# Patient Record
Sex: MALE | Race: WHITE | NOT HISPANIC OR LATINO | URBAN - METROPOLITAN AREA
[De-identification: names, ages, dates, MRNs, and addresses within clinical notes are randomized per-mention and may not be internally consistent; named-entity substitution may affect disease eponyms.]

---

## 2017-01-03 ENCOUNTER — OUTPATIENT (OUTPATIENT)
Dept: OUTPATIENT SERVICES | Facility: HOSPITAL | Age: 77
LOS: 1 days | End: 2017-01-03
Payer: MEDICARE

## 2017-01-03 ENCOUNTER — APPOINTMENT (OUTPATIENT)
Dept: NUCLEAR MEDICINE | Facility: CLINIC | Age: 77
End: 2017-01-03

## 2017-01-03 DIAGNOSIS — Z00.8 ENCOUNTER FOR OTHER GENERAL EXAMINATION: ICD-10-CM

## 2017-01-03 PROCEDURE — 78815 PET IMAGE W/CT SKULL-THIGH: CPT

## 2017-01-03 PROCEDURE — A9552: CPT

## 2017-01-03 PROCEDURE — 70491 CT SOFT TISSUE NECK W/DYE: CPT

## 2017-01-05 DIAGNOSIS — C44.90 UNSPECIFIED MALIGNANT NEOPLASM OF SKIN, UNSPECIFIED: ICD-10-CM

## 2017-01-05 DIAGNOSIS — R59.0 LOCALIZED ENLARGED LYMPH NODES: ICD-10-CM

## 2017-01-05 DIAGNOSIS — C44.122 SQUAMOUS CELL CARCINOMA OF SKIN OF RIGHT EYELID, INCLUDING CANTHUS: ICD-10-CM

## 2017-01-17 ENCOUNTER — APPOINTMENT (OUTPATIENT)
Dept: CARDIOLOGY | Facility: CLINIC | Age: 77
End: 2017-01-17

## 2017-01-23 ENCOUNTER — FORM ENCOUNTER (OUTPATIENT)
Age: 77
End: 2017-01-23

## 2017-01-24 ENCOUNTER — APPOINTMENT (OUTPATIENT)
Dept: MRI IMAGING | Facility: CLINIC | Age: 77
End: 2017-01-24

## 2017-01-24 ENCOUNTER — OUTPATIENT (OUTPATIENT)
Dept: OUTPATIENT SERVICES | Facility: HOSPITAL | Age: 77
LOS: 1 days | End: 2017-01-24
Payer: MEDICARE

## 2017-01-24 DIAGNOSIS — I65.29 OCCLUSION AND STENOSIS OF UNSPECIFIED CAROTID ARTERY: ICD-10-CM

## 2017-01-24 PROCEDURE — 70549 MR ANGIOGRAPH NECK W/O&W/DYE: CPT

## 2017-01-24 PROCEDURE — A9585: CPT

## 2017-02-07 ENCOUNTER — RX RENEWAL (OUTPATIENT)
Age: 77
End: 2017-02-07

## 2017-02-10 ENCOUNTER — APPOINTMENT (OUTPATIENT)
Dept: CT IMAGING | Facility: CLINIC | Age: 77
End: 2017-02-10

## 2017-02-10 ENCOUNTER — OUTPATIENT (OUTPATIENT)
Dept: OUTPATIENT SERVICES | Facility: HOSPITAL | Age: 77
LOS: 1 days | End: 2017-02-10
Payer: MEDICARE

## 2017-02-10 ENCOUNTER — APPOINTMENT (OUTPATIENT)
Dept: NEUROSURGERY | Facility: CLINIC | Age: 77
End: 2017-02-10

## 2017-02-10 DIAGNOSIS — I65.29 OCCLUSION AND STENOSIS OF UNSPECIFIED CAROTID ARTERY: ICD-10-CM

## 2017-02-10 PROCEDURE — 70498 CT ANGIOGRAPHY NECK: CPT | Mod: 26

## 2017-02-10 PROCEDURE — 70498 CT ANGIOGRAPHY NECK: CPT

## 2017-02-10 PROCEDURE — 70496 CT ANGIOGRAPHY HEAD: CPT

## 2017-02-10 PROCEDURE — 70496 CT ANGIOGRAPHY HEAD: CPT | Mod: 26

## 2017-02-22 ENCOUNTER — APPOINTMENT (OUTPATIENT)
Dept: CARDIOLOGY | Facility: CLINIC | Age: 77
End: 2017-02-22

## 2017-02-23 ENCOUNTER — APPOINTMENT (OUTPATIENT)
Dept: CARDIOLOGY | Facility: CLINIC | Age: 77
End: 2017-02-23

## 2017-03-03 ENCOUNTER — APPOINTMENT (OUTPATIENT)
Dept: INTERNAL MEDICINE | Facility: CLINIC | Age: 77
End: 2017-03-03

## 2017-03-03 VITALS
DIASTOLIC BLOOD PRESSURE: 60 MMHG | OXYGEN SATURATION: 96 % | RESPIRATION RATE: 14 BRPM | SYSTOLIC BLOOD PRESSURE: 170 MMHG | WEIGHT: 162 LBS | BODY MASS INDEX: 26.03 KG/M2 | HEIGHT: 66 IN | HEART RATE: 65 BPM

## 2017-03-03 VITALS — SYSTOLIC BLOOD PRESSURE: 160 MMHG | DIASTOLIC BLOOD PRESSURE: 70 MMHG

## 2017-03-04 ENCOUNTER — RX RENEWAL (OUTPATIENT)
Age: 77
End: 2017-03-04

## 2017-03-07 ENCOUNTER — APPOINTMENT (OUTPATIENT)
Dept: INTERNAL MEDICINE | Facility: CLINIC | Age: 77
End: 2017-03-07

## 2017-03-07 ENCOUNTER — NON-APPOINTMENT (OUTPATIENT)
Age: 77
End: 2017-03-07

## 2017-03-07 VITALS
DIASTOLIC BLOOD PRESSURE: 70 MMHG | OXYGEN SATURATION: 97 % | TEMPERATURE: 97.5 F | BODY MASS INDEX: 26.03 KG/M2 | RESPIRATION RATE: 14 BRPM | HEIGHT: 66 IN | SYSTOLIC BLOOD PRESSURE: 140 MMHG | WEIGHT: 162 LBS | HEART RATE: 104 BPM

## 2017-03-07 VITALS — SYSTOLIC BLOOD PRESSURE: 144 MMHG | DIASTOLIC BLOOD PRESSURE: 60 MMHG

## 2017-03-07 DIAGNOSIS — Z01.818 ENCOUNTER FOR OTHER PREPROCEDURAL EXAMINATION: ICD-10-CM

## 2017-03-08 LAB — POTASSIUM SERPL-SCNC: 5.3 MMOL/L

## 2017-03-14 ENCOUNTER — RESULT REVIEW (OUTPATIENT)
Age: 77
End: 2017-03-14

## 2017-03-14 VITALS
WEIGHT: 158.07 LBS | OXYGEN SATURATION: 96 % | TEMPERATURE: 97 F | DIASTOLIC BLOOD PRESSURE: 65 MMHG | RESPIRATION RATE: 16 BRPM | SYSTOLIC BLOOD PRESSURE: 144 MMHG | HEIGHT: 68 IN | HEART RATE: 94 BPM

## 2017-03-14 NOTE — PATIENT PROFILE ADULT. - VISION (WITH CORRECTIVE LENSES IF THE PATIENT USUALLY WEARS THEM):
pt has only one eye with normal vision/Normal vision: sees adequately in most situations; can see medication labels, newsprint

## 2017-03-14 NOTE — PATIENT PROFILE ADULT. - PSH
Encounter for biopsy  12/2012 - left side of head  Hx of cholecystectomy  2006  Squamous cell carcinoma  s/p multiple MOHs surgeries - head, face  last wide excision 1/13  Status post lateral meniscus repair Encounter for biopsy  12/2012 - left side of head  History of surgery  skull excision and removal of left eye  Hx of cholecystectomy  2006  Squamous cell carcinoma  s/p multiple MOHs surgeries - head, face  last wide excision 1/13  Status post lateral meniscus repair

## 2017-03-14 NOTE — PATIENT PROFILE ADULT. - PMH
Diabetes mellitus  Type 2  Hypertension    IBS (irritable bowel syndrome)    Pneumonia  > 5 years ago  Raynauds phenomenon    Squamous cell carcinoma    Thrombocytopenia Diabetes mellitus  Type 2  Gout    History of radiation therapy    History of skin cancer    HLD (hyperlipidemia)    Hypertension    IBS (irritable bowel syndrome)    Pneumonia  > 5 years ago  Raynauds phenomenon    Squamous cell carcinoma    Thrombocytopenia

## 2017-03-15 ENCOUNTER — INPATIENT (INPATIENT)
Facility: HOSPITAL | Age: 77
LOS: 0 days | Discharge: HOME | DRG: 39 | End: 2017-03-16
Attending: NEUROLOGICAL SURGERY | Admitting: NEUROLOGICAL SURGERY
Payer: COMMERCIAL

## 2017-03-15 ENCOUNTER — APPOINTMENT (OUTPATIENT)
Dept: NEUROSURGERY | Facility: HOSPITAL | Age: 77
End: 2017-03-15

## 2017-03-15 DIAGNOSIS — Z98.890 OTHER SPECIFIED POSTPROCEDURAL STATES: Chronic | ICD-10-CM

## 2017-03-15 LAB
ANION GAP SERPL CALC-SCNC: 9 MMOL/L — SIGNIFICANT CHANGE UP (ref 9–16)
APTT BLD: 29.5 SEC — SIGNIFICANT CHANGE UP (ref 27.5–37.4)
APTT BLD: 34.3 SEC — SIGNIFICANT CHANGE UP (ref 27.5–37.4)
BUN SERPL-MCNC: 29 MG/DL — HIGH (ref 7–23)
CALCIUM SERPL-MCNC: 8.3 MG/DL — LOW (ref 8.5–10.5)
CHLORIDE SERPL-SCNC: 106 MMOL/L — SIGNIFICANT CHANGE UP (ref 96–108)
CO2 SERPL-SCNC: 23 MMOL/L — SIGNIFICANT CHANGE UP (ref 22–31)
CREAT SERPL-MCNC: 1.01 MG/DL — SIGNIFICANT CHANGE UP (ref 0.5–1.3)
GLUCOSE SERPL-MCNC: 158 MG/DL — HIGH (ref 70–99)
HCT VFR BLD CALC: 32.5 % — LOW (ref 39–50)
HGB BLD-MCNC: 10.7 G/DL — LOW (ref 13–17)
INR BLD: 1.13 — SIGNIFICANT CHANGE UP (ref 0.88–1.16)
INR BLD: 1.2 — HIGH (ref 0.88–1.16)
LYMPHOCYTES # BLD AUTO: 14.1 % — SIGNIFICANT CHANGE UP (ref 13–44)
MAGNESIUM SERPL-MCNC: 1.9 MG/DL — SIGNIFICANT CHANGE UP (ref 1.6–2.4)
MCHC RBC-ENTMCNC: 32.7 PG — SIGNIFICANT CHANGE UP (ref 27–34)
MCHC RBC-ENTMCNC: 32.9 G/DL — SIGNIFICANT CHANGE UP (ref 32–36)
MCV RBC AUTO: 99.4 FL — SIGNIFICANT CHANGE UP (ref 80–100)
MONOCYTES NFR BLD AUTO: 1.8 % — LOW (ref 2–14)
NEUTROPHILS NFR BLD AUTO: 84.1 % — HIGH (ref 43–77)
PHOSPHATE SERPL-MCNC: 2.7 MG/DL — SIGNIFICANT CHANGE UP (ref 2.5–4.5)
PLATELET # BLD AUTO: 130 K/UL — LOW (ref 150–400)
POTASSIUM SERPL-MCNC: 4.3 MMOL/L — SIGNIFICANT CHANGE UP (ref 3.5–5.3)
POTASSIUM SERPL-SCNC: 4.3 MMOL/L — SIGNIFICANT CHANGE UP (ref 3.5–5.3)
PROTHROM AB SERPL-ACNC: 12.6 SEC — SIGNIFICANT CHANGE UP (ref 10–13.1)
PROTHROM AB SERPL-ACNC: 13.4 SEC — HIGH (ref 10–13.1)
RBC # BLD: 3.27 M/UL — LOW (ref 4.2–5.8)
RBC # FLD: 13.6 % — SIGNIFICANT CHANGE UP (ref 10.3–16.9)
SODIUM SERPL-SCNC: 138 MMOL/L — SIGNIFICANT CHANGE UP (ref 135–145)
TROPONIN I SERPL-MCNC: 0.02 NG/ML — SIGNIFICANT CHANGE UP (ref 0.01–0.04)
TROPONIN I SERPL-MCNC: 0.02 NG/ML — SIGNIFICANT CHANGE UP (ref 0.01–0.04)
WBC # BLD: 5.5 K/UL — SIGNIFICANT CHANGE UP (ref 3.8–10.5)
WBC # FLD AUTO: 5.5 K/UL — SIGNIFICANT CHANGE UP (ref 3.8–10.5)

## 2017-03-15 PROCEDURE — 99291 CRITICAL CARE FIRST HOUR: CPT | Mod: 24

## 2017-03-15 PROCEDURE — 35301 RECHANNELING OF ARTERY: CPT | Mod: RT

## 2017-03-15 PROCEDURE — 93010 ELECTROCARDIOGRAM REPORT: CPT

## 2017-03-15 RX ORDER — SODIUM CHLORIDE 9 MG/ML
1000 INJECTION, SOLUTION INTRAVENOUS
Qty: 0 | Refills: 0 | Status: DISCONTINUED | OUTPATIENT
Start: 2017-03-15 | End: 2017-03-16

## 2017-03-15 RX ORDER — RAMIPRIL 5 MG
1 CAPSULE ORAL
Qty: 0 | Refills: 0 | COMMUNITY

## 2017-03-15 RX ORDER — GLUCAGON INJECTION, SOLUTION 0.5 MG/.1ML
1 INJECTION, SOLUTION SUBCUTANEOUS ONCE
Qty: 0 | Refills: 0 | Status: DISCONTINUED | OUTPATIENT
Start: 2017-03-15 | End: 2017-03-16

## 2017-03-15 RX ORDER — INSULIN LISPRO 100/ML
VIAL (ML) SUBCUTANEOUS
Qty: 0 | Refills: 0 | Status: DISCONTINUED | OUTPATIENT
Start: 2017-03-15 | End: 2017-03-16

## 2017-03-15 RX ORDER — ASPIRIN/CALCIUM CARB/MAGNESIUM 324 MG
81 TABLET ORAL DAILY
Qty: 0 | Refills: 0 | Status: DISCONTINUED | OUTPATIENT
Start: 2017-03-15 | End: 2017-03-16

## 2017-03-15 RX ORDER — NICARDIPINE HYDROCHLORIDE 30 MG/1
5 CAPSULE, EXTENDED RELEASE ORAL
Qty: 40 | Refills: 0 | Status: DISCONTINUED | OUTPATIENT
Start: 2017-03-15 | End: 2017-03-16

## 2017-03-15 RX ORDER — DEXTROSE 50 % IN WATER 50 %
25 SYRINGE (ML) INTRAVENOUS ONCE
Qty: 0 | Refills: 0 | Status: DISCONTINUED | OUTPATIENT
Start: 2017-03-15 | End: 2017-03-16

## 2017-03-15 RX ORDER — CHOLECALCIFEROL (VITAMIN D3) 125 MCG
1 CAPSULE ORAL
Qty: 0 | Refills: 0 | COMMUNITY

## 2017-03-15 RX ORDER — DEXTROSE 50 % IN WATER 50 %
12.5 SYRINGE (ML) INTRAVENOUS ONCE
Qty: 0 | Refills: 0 | Status: DISCONTINUED | OUTPATIENT
Start: 2017-03-15 | End: 2017-03-16

## 2017-03-15 RX ORDER — ASPIRIN/CALCIUM CARB/MAGNESIUM 324 MG
1 TABLET ORAL
Qty: 0 | Refills: 0 | COMMUNITY

## 2017-03-15 RX ORDER — ACETAMINOPHEN 500 MG
1000 TABLET ORAL ONCE
Qty: 0 | Refills: 0 | Status: COMPLETED | OUTPATIENT
Start: 2017-03-15 | End: 2017-03-15

## 2017-03-15 RX ORDER — MAGNESIUM SULFATE 500 MG/ML
2 VIAL (ML) INJECTION ONCE
Qty: 0 | Refills: 0 | Status: DISCONTINUED | OUTPATIENT
Start: 2017-03-15 | End: 2017-03-15

## 2017-03-15 RX ORDER — ALLOPURINOL 300 MG
1 TABLET ORAL
Qty: 0 | Refills: 0 | COMMUNITY

## 2017-03-15 RX ORDER — DEXTROSE 50 % IN WATER 50 %
1 SYRINGE (ML) INTRAVENOUS ONCE
Qty: 0 | Refills: 0 | Status: DISCONTINUED | OUTPATIENT
Start: 2017-03-15 | End: 2017-03-16

## 2017-03-15 RX ORDER — HYDRALAZINE HCL 50 MG
1 TABLET ORAL
Qty: 0 | Refills: 0 | COMMUNITY

## 2017-03-15 RX ORDER — NICARDIPINE HYDROCHLORIDE 30 MG/1
5 CAPSULE, EXTENDED RELEASE ORAL
Qty: 40 | Refills: 0 | Status: DISCONTINUED | OUTPATIENT
Start: 2017-03-15 | End: 2017-03-15

## 2017-03-15 RX ORDER — MAGNESIUM SULFATE 500 MG/ML
2 VIAL (ML) INJECTION ONCE
Qty: 0 | Refills: 0 | Status: COMPLETED | OUTPATIENT
Start: 2017-03-15 | End: 2017-03-15

## 2017-03-15 RX ORDER — ASCORBIC ACID 60 MG
1 TABLET,CHEWABLE ORAL
Qty: 0 | Refills: 0 | COMMUNITY

## 2017-03-15 RX ADMIN — Medication 81 MILLIGRAM(S): at 20:01

## 2017-03-15 RX ADMIN — Medication 400 MILLIGRAM(S): at 18:38

## 2017-03-15 RX ADMIN — SODIUM CHLORIDE 75 MILLILITER(S): 9 INJECTION, SOLUTION INTRAVENOUS at 11:46

## 2017-03-15 RX ADMIN — Medication 1000 MILLIGRAM(S): at 20:01

## 2017-03-15 RX ADMIN — NICARDIPINE HYDROCHLORIDE 25 MG/HR: 30 CAPSULE, EXTENDED RELEASE ORAL at 14:02

## 2017-03-15 RX ADMIN — Medication 50 GRAM(S): at 14:39

## 2017-03-15 RX ADMIN — Medication 2: at 17:01

## 2017-03-15 NOTE — PROGRESS NOTE ADULT - SUBJECTIVE AND OBJECTIVE BOX
HPI:  76 yr old man with multiple medical problems, including DM, HTN, hyperlipidemia, and squamous cell carcinoma of head and face (currently in remission, S/P radical resection 2013 and XRT), who was recently found to have rapidly progressive R ICA stenosis on MRA (from 50% up to 85% on recent imaging). Pt is totally asymptomatic from a neurological standpoint, no recent or remote history of ischemic stroke. CTA shows a heavily calcified, short-segment atherosclerotic plaque at the origin of the R ICA. The patient is being admitted for elective R CEA. (15 Mar 2017 11:10)    Patient evaluated in PACU post procedure neurologically intact reporting 4/10 incisional pain  denies blurry vision, CP, SOB or difficulty breathing    OVERNIGHT EVENTS:  Vital Signs Last 24 Hrs  T(C): 36.6, Max: 37 (03-15 @ 11:30)  T(F): 97.9, Max: 98.6 (03-15 @ 11:30)  HR: 75 (64 - 75)  BP: 129/54 (105/51 - 132/55)  BP(mean): --  RR: 22 (16 - 22)  SpO2: 99% (99% - 100%)    I&O's Detail    I & Os for current day (as of 15 Mar 2017 14:01)  =============================================  IN:    Total IN: 0 ml  ---------------------------------------------  OUT:    Voided: 200 ml    Total OUT: 200 ml  ---------------------------------------------  Total NET: -200 ml    I&O's Summary    I & Os for current day (as of 15 Mar 2017 14:01)  =============================================  IN: 0 ml / OUT: 200 ml / NET: -200 ml      PHYSICAL EXAM:  Neurological:  A&O x 3, appears comfortable  Rt eye PERRL, EOMI  face symmetric  neg drift  ASHFORD x 4, motor 5/5 throughout   sensation intact b/l  incision site with scant bloody staining, IRAIDA drain intact    TUBES/LINES:  [] CVC  [x] A-line  [] Lumbar Drain  [] Ventriculostomy  [x] Other: surgical drain    DIET:  [x] NPO  [] Mechanical  [] Tube feeds    LABS:                        10.7   5.5   )-----------( 130      ( 15 Mar 2017 12:18 )             32.5     15 Mar 2017 12:18    138    |  106    |  29     ----------------------------<  158    4.3     |  23     |  1.01     Ca    8.3        15 Mar 2017 12:18  Phos  2.7       15 Mar 2017 12:18  Mg     1.9       15 Mar 2017 12:18      PT/INR - ( 15 Mar 2017 12:18 )   PT: 13.4 sec;   INR: 1.20          PTT - ( 15 Mar 2017 12:18 )  PTT:34.3 sec    CARDIAC MARKERS ( 15 Mar 2017 12:18 )  0.019 ng/mL / x     / x     / x     / x          CAPILLARY BLOOD GLUCOSE  158 (15 Mar 2017 12:00)      Drug Levels: [] N/A    CSF Analysis: [] N/A      Allergies    azithromycin (Hives)  red dye (Hives)    Intolerances      MEDICATIONS:  Antibiotics:    Neuro:  acetaminophen  IVPB. 1000milliGRAM(s) IV Intermittent once    Anticoagulation:    OTHER:  niCARdipine Infusion 5mG/Hr IV Continuous <Continuous>  insulin lispro (HumaLOG) corrective regimen sliding scale  SubCutaneous Before meals and at bedtime  dextrose Gel 1Dose(s) Oral once PRN  dextrose 50% Injectable 12.5Gram(s) IV Push once  dextrose 50% Injectable 25Gram(s) IV Push once  dextrose 50% Injectable 25Gram(s) IV Push once  glucagon  Injectable 1milliGRAM(s) IntraMuscular once PRN    IVF:  lactated ringers. 1000milliLiter(s) IV Continuous <Continuous>  dextrose 5%. 1000milliLiter(s) IV Continuous <Continuous>    CULTURES:    RADIOLOGY & ADDITIONAL TESTS:      ASSESSMENT:  76y Male s/p Rt CEA POD 0 neuro stable    PLAN:  NEURO:  q1h neuro checks  transfer to ICU pending bed availability  trend IRAIDA output, monitor incision site  pain control tylenol/percocet prn  carotid duplex in am    CARDIOVASCULAR:  SRAVAN, f/u ekgs post op  SBP goal 100-130 on cardene gtt  monitor a line    PULMONARY:  wean supplemental NC  enc IS use  OOB    RENAL:  NS hydration  replete mag  voiding    GI:  NPO  can advance diet    HEME:  trend h/h    ID:  post op ancef    ENDO:  ISS    DVT PROPHYLAXIS:  [x] Venodynes                                [] Heparin/Lovenox    DISPOSITION:   ICU status  full code  dispo planning  case d/w Dr. Encarnacion and Dr. Zamarripa

## 2017-03-15 NOTE — PROGRESS NOTE ADULT - ASSESSMENT
76M with severe right internal carotid artery stenosis, s/p carotid endarterectomy (03/15/2017, Dr. Zamarripa), Hypertension dyslipidemia dyslipidemia, gout, squamous cell carcinoma, BPH

## 2017-03-15 NOTE — BRIEF OPERATIVE NOTE - OPERATION/FINDINGS
PROCEDURE  R CEA    Heavily calcified, short-segment atherosclerotic plaque  Successful procedure, no complications

## 2017-03-15 NOTE — H&P ADULT - HISTORY OF PRESENT ILLNESS
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 Pt is totally asymptomatic from a neurological standpoint, no recent or remote history of ischemic stroke. CTA shows a heavily calcified, short-segment atherosclerotic plaque at the origin of the R ICA. The patient is being admitted for elective R CEA.

## 2017-03-15 NOTE — H&P ADULT - NSHPPHYSICALEXAM_GEN_ALL_CORE
Heart RRR  Lungs clear  Abd soft, non tender  L eye enucleated (squamous cell carcinoma)  L head and face skin flap well healed (covers R orbit)    Neuro exam:  AAOx3, fluent speech  R pupil small and reactive, EOMI, CN intact  Motor 5/5 x 4  Sensory intact LT x 4

## 2017-03-15 NOTE — PROGRESS NOTE ADULT - SUBJECTIVE AND OBJECTIVE BOX
=================================  NEUROCRITICAL CARE ATTENDING NOTE  =================================    FINKELSTEIN, HAROLD   [id number]  Summary:  76M with Diabetes Mellitus Hypertension dyslipidemia and SCC of head and face s/p radical resection and XRT ().  On imaging noted rapidly progressing R ICA stenosis (50% increasing to 85%), but clinically asymptomatic neurologically.  CTA showed a heavily calcified short-segment atherosclerotic plaque at R ICA origin. Admitted 03/15 for elective CEA.  Overnight Events: in PACU    PAST MEDICAL & SURGICAL HISTORY: History of skin cancer  Gout HLD (hyperlipidemia) History of radiation therapy Raynauds phenomenon Thrombocytopenia Squamous cell carcinoma IBS (irritable bowel syndrome) Diabetes mellitus: Type 2 BPH (benign prostatic hypertrophy) Pneumonia: &gt; 5 years ago Hypertension History of surgery: skull excision and removal of left eye Status post lateral meniscus repair Encounter for biopsy: 2012 - left side of head Squamous cell carcinoma: s/p multiple MOHs surgeries - head, face last wide excision  H/O arthroscopy of knee: 2012 - left Hx of cholecystectomy:   Home meds:  lipitor 10mg BID glipizide 2.5 mg daily ramipril 5mg daily hydralazine 50mg TID vitamin C cholecalciferol [id number] daily allopurinol    PHYSICAL EXAMINATION  T(C): , Max: 37 (03-15 @ 11:30) HR: 82 (64 - 82) BP: 146/67 (105/51 - 146/67) RR: 16 (16 - 22) SpO2: 99% (99% - 100%)  NEUROLOGIC EXAMINATION:  Patient is awake, alert, fully oriented, R pupils 2mm equal and briskly reactive to light, EOMs intact, moves all 4s  GENERAL:  not intubated, not in cardiorespiratory distress  EENT: anicteric  CARDIOVASC:  (+) S1 S2, normal rate and regular rhythm  PULMONARY:  clear to auscultation bilaterally  ABDOMEN:  soft, nontender, with normoactive bowel sounds  EXTREMITIES:  no edema  SKIN:  no rash    LABS:  CAPILLARY BLOOD GLUCOSE 158 (15 Mar 2017 12:00)                        10.7   5.5   )-----------( 130      ( 15 Mar 2017 12:18 )             32.5     138    |  106    |  29     ----------------------------<  158    4.3     |  23     |  1.01     Ca    8.3        15 Mar 2017 12:18  Phos  2.7       15 Mar 2017 12:18  Mg     1.9       15 Mar 2017 12:18    Neuroimagin/10 Dense calcified atherosclerotic plaque R carotid bifurcation and distal common carotid artery, at least 84% stenosis of ICA  Other imaging:     MEDICATIONS: mod ISS asa 81mg daily    IV FLUIDS: LR  DRIPS: cardene  DIET:  Lines / Drains: A line, Tirado    CODE STATUS:  full code                       GOALS OF CARE:  aggressive                      DISPOSITION:  ICU

## 2017-03-15 NOTE — PROGRESS NOTE ADULT - ATTENDING COMMENTS
PLAN:   NEURO: neurochecks q1h, PRN pain meds with Tylenol  s/p CEA:  monitor hemodynamics overnight, WOF hyper/hypotension; continue ASA as per NS  REHAB:  --defer for now---    EARLY MOB:  HOB up    PULM:  Room air, incentive spirometry  CARDIO:  SBP goal 100-130 mm Hg, cardene drip titrate to SBP goal; will restart PO meds in AM if hemodynamically stable  ENDO:  Blood sugar goals 140-180 mg/dL, continue insulin sliding scale, dyslipidemia: continue lipitor  GI:  docusate senna  DIET: bedside swallow, advance if he passes swallow screen  RENAL:  IVF until adequate PO intake  HEM/ONC: check post-op Hb  VTE Prophylaxis:  SCDs only, no DVT chemoprophylaxis as patient is fresh post-op  ID: afebrile, no leukocytosis  Social: will update family  MISC: Gout: continue allopurinol    Patient at high risk for neurological deterioration or death due to:  hypertension / hypotension, infarction, MI.  Critical care time, excluding procedures: 60 minutes.

## 2017-03-15 NOTE — H&P ADULT - PMH
Diabetes mellitus  Type 2  Gout    History of radiation therapy    History of skin cancer    HLD (hyperlipidemia)    Hypertension    IBS (irritable bowel syndrome)    Pneumonia  > 5 years ago  Raynauds phenomenon    Squamous cell carcinoma    Thrombocytopenia

## 2017-03-15 NOTE — H&P ADULT - PSH
Encounter for biopsy  12/2012 - left side of head  History of surgery  skull excision and removal of left eye  Hx of cholecystectomy  2006  Squamous cell carcinoma  s/p multiple MOHs surgeries - head, face  last wide excision 1/13  Status post lateral meniscus repair

## 2017-03-16 ENCOUNTER — TRANSCRIPTION ENCOUNTER (OUTPATIENT)
Age: 77
End: 2017-03-16

## 2017-03-16 VITALS
OXYGEN SATURATION: 97 % | RESPIRATION RATE: 24 BRPM | DIASTOLIC BLOOD PRESSURE: 66 MMHG | SYSTOLIC BLOOD PRESSURE: 157 MMHG | HEART RATE: 76 BPM

## 2017-03-16 LAB
ANION GAP SERPL CALC-SCNC: 6 MMOL/L — LOW (ref 9–16)
BUN SERPL-MCNC: 22 MG/DL — SIGNIFICANT CHANGE UP (ref 7–23)
CALCIUM SERPL-MCNC: 8.8 MG/DL — SIGNIFICANT CHANGE UP (ref 8.5–10.5)
CHLORIDE SERPL-SCNC: 106 MMOL/L — SIGNIFICANT CHANGE UP (ref 96–108)
CO2 SERPL-SCNC: 28 MMOL/L — SIGNIFICANT CHANGE UP (ref 22–31)
CREAT SERPL-MCNC: 1.05 MG/DL — SIGNIFICANT CHANGE UP (ref 0.5–1.3)
GLUCOSE SERPL-MCNC: 125 MG/DL — HIGH (ref 70–99)
HBA1C BLD-MCNC: 6 % — HIGH (ref 4.8–5.6)
HCT VFR BLD CALC: 31.2 % — LOW (ref 39–50)
HGB BLD-MCNC: 10.1 G/DL — LOW (ref 13–17)
MAGNESIUM SERPL-MCNC: 2.3 MG/DL — SIGNIFICANT CHANGE UP (ref 1.6–2.4)
MCHC RBC-ENTMCNC: 32.2 PG — SIGNIFICANT CHANGE UP (ref 27–34)
MCHC RBC-ENTMCNC: 32.4 G/DL — SIGNIFICANT CHANGE UP (ref 32–36)
MCV RBC AUTO: 99.4 FL — SIGNIFICANT CHANGE UP (ref 80–100)
PHOSPHATE SERPL-MCNC: 3.5 MG/DL — SIGNIFICANT CHANGE UP (ref 2.5–4.5)
PLATELET # BLD AUTO: 121 K/UL — LOW (ref 150–400)
POTASSIUM SERPL-MCNC: 4.3 MMOL/L — SIGNIFICANT CHANGE UP (ref 3.5–5.3)
POTASSIUM SERPL-SCNC: 4.3 MMOL/L — SIGNIFICANT CHANGE UP (ref 3.5–5.3)
RBC # BLD: 3.14 M/UL — LOW (ref 4.2–5.8)
RBC # FLD: 13.7 % — SIGNIFICANT CHANGE UP (ref 10.3–16.9)
SODIUM SERPL-SCNC: 140 MMOL/L — SIGNIFICANT CHANGE UP (ref 135–145)
SURGICAL PATHOLOGY STUDY: SIGNIFICANT CHANGE UP
WBC # BLD: 7.2 K/UL — SIGNIFICANT CHANGE UP (ref 3.8–10.5)
WBC # FLD AUTO: 7.2 K/UL — SIGNIFICANT CHANGE UP (ref 3.8–10.5)

## 2017-03-16 PROCEDURE — 88304 TISSUE EXAM BY PATHOLOGIST: CPT

## 2017-03-16 PROCEDURE — 86900 BLOOD TYPING SEROLOGIC ABO: CPT

## 2017-03-16 PROCEDURE — 85610 PROTHROMBIN TIME: CPT

## 2017-03-16 PROCEDURE — 80048 BASIC METABOLIC PNL TOTAL CA: CPT

## 2017-03-16 PROCEDURE — 86901 BLOOD TYPING SEROLOGIC RH(D): CPT

## 2017-03-16 PROCEDURE — 83735 ASSAY OF MAGNESIUM: CPT

## 2017-03-16 PROCEDURE — 85025 COMPLETE CBC W/AUTO DIFF WBC: CPT

## 2017-03-16 PROCEDURE — 97161 PT EVAL LOW COMPLEX 20 MIN: CPT

## 2017-03-16 PROCEDURE — 88311 DECALCIFY TISSUE: CPT

## 2017-03-16 PROCEDURE — 36415 COLL VENOUS BLD VENIPUNCTURE: CPT

## 2017-03-16 PROCEDURE — 84100 ASSAY OF PHOSPHORUS: CPT

## 2017-03-16 PROCEDURE — 83036 HEMOGLOBIN GLYCOSYLATED A1C: CPT

## 2017-03-16 PROCEDURE — C1889: CPT

## 2017-03-16 PROCEDURE — 99233 SBSQ HOSP IP/OBS HIGH 50: CPT | Mod: 24

## 2017-03-16 PROCEDURE — 86850 RBC ANTIBODY SCREEN: CPT

## 2017-03-16 PROCEDURE — 93880 EXTRACRANIAL BILAT STUDY: CPT

## 2017-03-16 PROCEDURE — 85027 COMPLETE CBC AUTOMATED: CPT

## 2017-03-16 PROCEDURE — 93005 ELECTROCARDIOGRAM TRACING: CPT

## 2017-03-16 PROCEDURE — 84484 ASSAY OF TROPONIN QUANT: CPT

## 2017-03-16 PROCEDURE — 93880 EXTRACRANIAL BILAT STUDY: CPT | Mod: 26

## 2017-03-16 PROCEDURE — 85730 THROMBOPLASTIN TIME PARTIAL: CPT

## 2017-03-16 RX ORDER — ATORVASTATIN CALCIUM 80 MG/1
10 TABLET, FILM COATED ORAL AT BEDTIME
Qty: 0 | Refills: 0 | Status: DISCONTINUED | OUTPATIENT
Start: 2017-03-16 | End: 2017-03-16

## 2017-03-16 RX ORDER — LISINOPRIL 2.5 MG/1
20 TABLET ORAL DAILY
Qty: 0 | Refills: 0 | Status: DISCONTINUED | OUTPATIENT
Start: 2017-03-16 | End: 2017-03-16

## 2017-03-16 RX ORDER — ASPIRIN/CALCIUM CARB/MAGNESIUM 324 MG
1 TABLET ORAL
Qty: 0 | Refills: 0 | COMMUNITY

## 2017-03-16 RX ORDER — ACETAMINOPHEN 500 MG
1000 TABLET ORAL ONCE
Qty: 0 | Refills: 0 | Status: COMPLETED | OUTPATIENT
Start: 2017-03-16 | End: 2017-03-16

## 2017-03-16 RX ORDER — ASPIRIN/CALCIUM CARB/MAGNESIUM 324 MG
1 TABLET ORAL
Qty: 30 | Refills: 1 | OUTPATIENT
Start: 2017-03-16 | End: 2017-05-14

## 2017-03-16 RX ORDER — ALLOPURINOL 300 MG
100 TABLET ORAL DAILY
Qty: 0 | Refills: 0 | Status: DISCONTINUED | OUTPATIENT
Start: 2017-03-16 | End: 2017-03-16

## 2017-03-16 RX ADMIN — Medication 400 MILLIGRAM(S): at 02:32

## 2017-03-16 RX ADMIN — LISINOPRIL 20 MILLIGRAM(S): 2.5 TABLET ORAL at 07:02

## 2017-03-16 RX ADMIN — Medication 1000 MILLIGRAM(S): at 03:00

## 2017-03-16 RX ADMIN — Medication 100 MILLIGRAM(S): at 15:48

## 2017-03-16 RX ADMIN — Medication 81 MILLIGRAM(S): at 12:40

## 2017-03-16 NOTE — DISCHARGE NOTE ADULT - HOSPITAL COURSE
76 year old man underwent right CEA for carotid stenosis.  Observed overnight in SICU. IRAIDA drain removed on POD#1.  Seen and cleared by physical therapy for discharge home.  Uncomplicated hospital course

## 2017-03-16 NOTE — DISCHARGE NOTE ADULT - PATIENT PORTAL LINK FT
“You can access the [facility] Patient Portal, offered by [facility], by registering with the following website: [email or web address]”

## 2017-03-16 NOTE — PROGRESS NOTE ADULT - ATTENDING COMMENTS
PLAN:   NEURO: neurochecks q1h, PRN pain meds with Tylenol  s/p CEA:  monitor hemodynamics overnight, WOF hyper/hypotension; continue ASA as per NS  REHAB:  --defer for now---    EARLY MOB:  HOB up    PULM:  Room air, incentive spirometry  CARDIO:  SBP goal 100-130 mm Hg, cardene drip titrate to SBP goal; will restart PO meds in AM if hemodynamically stable  ENDO:  Blood sugar goals 140-180 mg/dL, continue insulin sliding scale, dyslipidemia: continue lipitor  GI:  docusate senna  DIET: bedside swallow, advance if he passes swallow screen  RENAL:  IVF until adequate PO intake  HEM/ONC: check post-op Hb  VTE Prophylaxis:  SCDs only, no DVT chemoprophylaxis as patient is fresh post-op  ID: afebrile, no leukocytosis  Social: will update family  MISC: Gout: continue allopurinol    Patient at high risk for neurological deterioration or death due to:  hypertension / hypotension, infarction, MI.  Critical care time, excluding procedures: 60 minutes. PLAN:   NEURO: neurochecks q4h, PRN pain meds with Tylenol  s/p CEA:  continue ASA as per NS; carotid doppler, if patent flow - will discharge home  REHAB:  PT eval  EARLY MOB:  OOB to chair, ambulate    PULM:  Room air, incentive spirometry  CARDIO:  SBP goal 100-150 mm Hg, off cardene drip continue ACEi, will restart hydralazine if needed  ENDO:  Blood sugar goals 140-180 mg/dL, continue insulin sliding scale, dyslipidemia: continue lipitor  GI:  docusate senna  DIET: Diabetes Mellitus diet - advance  RENAL:  d/c VI fluids  HEM/ONC: Hb stable  VTE Prophylaxis:  SCDs only, no DVT chemoprophylaxis - ambulate  ID: afebrile, no leukocytosis  Social: daughter updated this morning  MISC: Gout: restart allopurinol    Patient at high risk for neurological deterioration or death due to:  hypertension / hypotension, infarction, MI.  Critical care time, excluding procedures: 45 minutes. PLAN:   NEURO: neurochecks q4h, PRN pain meds with Tylenol  s/p CEA:  continue ASA as per NS; carotid doppler, if patent flow - will discharge home  REHAB:  PT eval  EARLY MOB:  OOB to chair, ambulate    PULM:  Room air, incentive spirometry  CARDIO:  SBP goal 100-150 mm Hg, off cardene drip continue ACEi, will restart hydralazine if needed  ENDO:  Blood sugar goals 140-180 mg/dL, continue insulin sliding scale, dyslipidemia: continue lipitor  GI:  docusate senna  DIET: Diabetes Mellitus diet - advance  RENAL:  d/c VI fluids  HEM/ONC: Hb stable  VTE Prophylaxis:  SCDs only, no DVT chemoprophylaxis - ambulate  ID: afebrile, no leukocytosis  Social: daughter updated this morning  MISC: Gout: restart allopurinol    Patient not at high risk for neurologic deterioration / death.  Time spent on this noncritically ill patient: 45 minutes.

## 2017-03-16 NOTE — DISCHARGE NOTE ADULT - CARE PLAN
Principal Discharge DX:	Stenosis of right carotid artery  Goal:	return to full function  Instructions for follow-up, activity and diet:	1) May bathe.  Keep incision clean and dry.  White bandages (steri-strips) will fall off on their own (do not pull them off yourself)  2) Notify Dr. Zamarripa's office for pain uncontrolled with medication, or increasing redness/drainage near wound  3) Go to nearest emergency room if difficulty breathing/swallowing, new or worsening weakness of the arms/legs, fever>101  4) Call Dr. Zamarripa's office to schedule follow up appointment, [phone] Principal Discharge DX:	Stenosis of right carotid artery  Goal:	return to full function  Instructions for follow-up, activity and diet:	1) May bathe.  Keep incision clean and dry.  White bandages (steri-strips) will fall off on their own (do not pull them off yourself)  2) Notify Dr. Zamarripa's office for pain uncontrolled with medication, or increasing redness/drainage near wound  3) Go to nearest emergency room if difficulty breathing/swallowing, new or worsening weakness of the arms/legs, fever>101  4) Call Dr. Zamarripa's office to schedule follow up appointment, [phone]

## 2017-03-16 NOTE — PROGRESS NOTE ADULT - SUBJECTIVE AND OBJECTIVE BOX
Subjective: Seen/evaluated at bedside.  Doing well, no new complaints.  No difficulty swallowing.   No overnight events.  Weaned off cardene    T(C): 35.8, Max: 37 (03-15 @ 11:30)  HR: 56 (50 - 82)  BP: 151/64 (105/51 - 151/64)  RR: 21 (12 - 37)  SpO2: 96% (93% - 100%)  Wt(kg): --    Exam: A/Ox3, FC, speech clear  ASHFORD 5/5, no drift  CN II-XII grossly intact      CBC Full  -  ( 16 Mar 2017 04:14 )  WBC Count : 7.2 K/uL  Hemoglobin : 10.1 g/dL  Hematocrit : 31.2 %  Platelet Count - Automated : 121 K/uL  Mean Cell Volume : 99.4 fL  Mean Cell Hemoglobin : 32.2 pg  Mean Cell Hemoglobin Concentration : 32.4 g/dL  Auto Neutrophil # : x  Auto Lymphocyte # : x  Auto Monocyte # : x  Auto Eosinophil # : x  Auto Basophil # : x  Auto Neutrophil % : x  Auto Lymphocyte % : x  Auto Monocyte % : x  Auto Eosinophil % : x  Auto Basophil % : x    16 Mar 2017 04:14    140    |  106    |  22     ----------------------------<  125    4.3     |  28     |  1.05     Ca    8.8        16 Mar 2017 04:14  Phos  3.5       16 Mar 2017 04:14  Mg     2.3       16 Mar 2017 04:14      PT/INR - ( 15 Mar 2017 12:18 )   PT: 13.4 sec;   INR: 1.20          PTT - ( 15 Mar 2017 12:18 )  PTT:34.3 sec      Wound: C/D/I, IRAIDA drain removed without difficulty, tip intact.  Steri-strips applied.  Patient tolerated well      Assessment/Plan: s/p right CEA  -PT/OOB  -US carotid artery pending  -Advance diet  -D/C today  -D/W Dr. Zamarripa

## 2017-03-16 NOTE — DISCHARGE NOTE ADULT - MEDICATION SUMMARY - MEDICATIONS TO CHANGE
I will SWITCH the dose or number of times a day I take the medications listed below when I get home from the hospital:    aspirin 81 mg oral tablet  -- 1 tab(s) by mouth 3 times a week  -- pt states took 4 baby ASA this am

## 2017-03-16 NOTE — DISCHARGE NOTE ADULT - CARE PROVIDER_API CALL
Tyler Zamarripa), Neurological Surgery  [address] [address]  Phone: [phone]  Fax: [phone]

## 2017-03-16 NOTE — DISCHARGE NOTE ADULT - MEDICATION SUMMARY - MEDICATIONS TO TAKE
I will START or STAY ON the medications listed below when I get home from the hospital:    Percocet 5/325 oral tablet  -- 1-2 tab(s) by mouth every 4 hours, As needed, Pain MDD:3g tylenol  -- Indication: For pain    Aspirin Enteric Coated 81 mg oral delayed release tablet  -- 1 tab(s) by mouth once a day  -- Indication: For Stroke prevention    ramipril 5 mg oral tablet  -- 1 tab(s) by mouth once a day  -- Indication: For High blood pressure    glipiZIDE 2.5 mg oral tablet, extended release  -- 1 tab(s) by mouth once a day  -- Indication: For DM    allopurinol 100 mg oral tablet  -- 1 tab(s) by mouth 5 times a week  -- Indication: For Gout    Lipitor 10 mg oral tablet  -- 1 tab(s) by mouth 2 times a week  -- taken Sunday and Thursday only  -- Indication: For High cholesterol/stroke prevention    hydrALAZINE 50 mg oral tablet  -- 1 tab(s) by mouth 3 times a day  -- Indication: For High blood pressure    Vitamin C 500 mg oral tablet  -- 1 tab(s) by mouth once a day  -- Indication: For Supplementation    cholecalciferol 2000 intl units oral tablet  -- 1 tab(s) by mouth once a day  -- Indication: For Supplementatiomn

## 2017-03-16 NOTE — PROGRESS NOTE ADULT - SUBJECTIVE AND OBJECTIVE BOX
=================================  NEUROCRITICAL CARE ATTENDING NOTE  =================================    FINKELSTEIN, HAROLD   [id number]  Summary:  76M with Diabetes Mellitus Hypertension dyslipidemia and SCC of head and face s/p radical resection and XRT ().  On imaging noted rapidly progressing R ICA stenosis (50% increasing to 85%), but clinically asymptomatic neurologically.  CTA showed a heavily calcified short-segment atherosclerotic plaque at R ICA origin. Admitted 03/15 for elective CEA.  Overnight Events: in PACU    PAST MEDICAL & SURGICAL HISTORY: History of skin cancer  Gout HLD (hyperlipidemia) History of radiation therapy Raynauds phenomenon Thrombocytopenia Squamous cell carcinoma IBS (irritable bowel syndrome) Diabetes mellitus: Type 2 BPH (benign prostatic hypertrophy) Pneumonia: &gt; 5 years ago Hypertension History of surgery: skull excision and removal of left eye Status post lateral meniscus repair Encounter for biopsy: 2012 - left side of head Squamous cell carcinoma: s/p multiple MOHs surgeries - head, face last wide excision  H/O arthroscopy of knee: 2012 - left Hx of cholecystectomy:   Home meds:  lipitor 10mg BID glipizide 2.5 mg daily ramipril 5mg daily hydralazine 50mg TID vitamin C cholecalciferol [id number] daily allopurinol    PHYSICAL EXAMINATION  T(C): , Max: 37 (03-15 @ 11:30) HR: 62 (50 - 82) BP: 141/64 (105/51 - 146/67) RR: 37 (12 - 37) SpO2: 95% (93% - 100%)  NEUROLOGIC EXAMINATION:  Patient is awake, alert, fully oriented, R pupils 2mm equal and briskly reactive to light, EOMs intact, moves all 4s  GENERAL:  not intubated, not in cardiorespiratory distress  EENT: anicteric  CARDIOVASC:  (+) S1 S2, normal rate and regular rhythm  PULMONARY:  clear to auscultation bilaterally  ABDOMEN:  soft, nontender, with normoactive bowel sounds  EXTREMITIES:  no edema  SKIN:  no rash    LABS:  CAPILLARY BLOOD GLUCOSE 110 (16 Mar 2017 07:00) 158 (15 Mar 2017 12:00)                        10.1   7.2   )-----------( 121      ( 16 Mar 2017 04:14 )             31.2     140    |  106    |  22     ----------------------------<  125    4.3     |  28     |  1.05     Ca    8.8        16 Mar 2017 04:14  Phos  3.5       16 Mar 2017 04:14  Mg     2.3       16 Mar 2017 04:14    I & Os for current day (as of  @ 07:45)  IN: 3025 ml / OUT: 2735 ml / NET: 290 ml    Neuroimagin/10 Dense calcified atherosclerotic plaque R carotid bifurcation and distal common carotid artery, at least 84% stenosis of ICA  Other imaging:     MEDICATIONS: mod ISS asa 81mg daily lisinopril 20mg daily    IV FLUIDS: LR  DRIPS: cardene  DIET:  Lines / Drains: A line, Tirado    CODE STATUS:  full code                       GOALS OF CARE:  aggressive                      DISPOSITION:  ICU =================================  NEUROCRITICAL CARE ATTENDING NOTE  =================================    FINKELSTEIN, HAROLD   MRN-[id number]  Summary:  76M with Diabetes Mellitus Hypertension dyslipidemia and SCC of head and face s/p radical resection and XRT ().  On imaging noted rapidly progressing R ICA stenosis (50% increasing to 85%), but clinically asymptomatic neurologically.  CTA showed a heavily calcified short-segment atherosclerotic plaque at R ICA origin. Admitted 03/15 for elective CEA.  Overnight Events: no events overnight    PAST MEDICAL & SURGICAL HISTORY: History of skin cancer  Gout HLD (hyperlipidemia) History of radiation therapy Raynauds phenomenon Thrombocytopenia Squamous cell carcinoma IBS (irritable bowel syndrome) Diabetes mellitus: Type 2 BPH (benign prostatic hypertrophy) Pneumonia: &gt; 5 years ago Hypertension History of surgery: skull excision and removal of left eye Status post lateral meniscus repair Encounter for biopsy: 2012 - left side of head Squamous cell carcinoma: s/p multiple MOHs surgeries - head, face last wide excision  H/O arthroscopy of knee: 2012 - left Hx of cholecystectomy:   Home meds:  lipitor 10mg BID glipizide 2.5 mg daily ramipril 5mg daily hydralazine 50mg TID vitamin C cholecalciferol [id number] daily allopurinol    PHYSICAL EXAMINATION  T(C): , Max: 36.9 (-15 @ 20:00) HR: 60 (50 - 82) BP: 151/64 (105/51 - 151/64) RR: 32 (12 - 16) SpO2: 97% (93% - 100%)  NEUROLOGIC EXAMINATION:  Patient is awake, alert, fully oriented, R pupils 2mm equal and briskly reactive to light, EOMs intact, moves all 4s  GENERAL:  not intubated, not in cardiorespiratory distress  EENT: anicteric  CARDIOVASC:  (+) S1 S2, normal rate and regular rhythm  PULMONARY:  clear to auscultation bilaterally  ABDOMEN:  soft, nontender, with normoactive bowel sounds  EXTREMITIES:  no edema  SKIN:  no rash    LABS:  CAPILLARY BLOOD GLUCOSE 110 (16 Mar 2017 07:00) 158 (15 Mar 2017 12:00)                        10.1   7.2   )-----------( 121      ( 16 Mar 2017 04:14 )             31.2     140    |  106    |  22     ----------------------------<  125    4.3     |  28     |  1.05     Ca    8.8        16 Mar 2017 04:14  Phos  3.5       16 Mar 2017 04:14  Mg     2.3       16 Mar 2017 04:14    HbA1C 6.0    I & Os for current day (as of  @ 07:45)  IN: 3025 ml / OUT: 2735 ml / NET: 290 ml    Neuroimagin/10 Dense calcified atherosclerotic plaque R carotid bifurcation and distal common carotid artery, at least 84% stenosis of ICA  Other imaging:     MEDICATIONS: mod ISS asa 81mg daily lisinopril 20mg daily    IV FLUIDS: LR  DRIPS: cardene - off since last night  DIET:  Lines / Drains: IRAIDA Mccarty - removed this morning    CODE STATUS:  full code                       GOALS OF CARE:  aggressive                      DISPOSITION:  ICU =================================  NEUROCRITICAL CARE ATTENDING NOTE  =================================    FINKELSTEIN, HAROLD   [id number]  Summary:  76M with Diabetes Mellitus Hypertension dyslipidemia and SCC of head and face s/p radical resection and XRT ().  On imaging noted rapidly progressing R ICA stenosis (50% increasing to 85%), but clinically asymptomatic neurologically.  CTA showed a heavily calcified short-segment atherosclerotic plaque at R ICA origin. Admitted 03/15 for elective CEA.  Overnight Events: no events overnight  REVIEW OF SYSTEMS:  No headaches, no nausea or vomiting; some neck pain (superficial, mild), 14 -point review of systems otherwise unremarkable.    PAST MEDICAL & SURGICAL HISTORY: History of skin cancer  Gout HLD (hyperlipidemia) History of radiation therapy Raynauds phenomenon Thrombocytopenia Squamous cell carcinoma IBS (irritable bowel syndrome) Diabetes mellitus: Type 2 BPH (benign prostatic hypertrophy) Pneumonia: &gt; 5 years ago Hypertension History of surgery: skull excision and removal of left eye Status post lateral meniscus repair Encounter for biopsy: 2012 - left side of head Squamous cell carcinoma: s/p multiple MOHs surgeries - head, face last wide excision  H/O arthroscopy of knee: 2012 - left Hx of cholecystectomy:   Home meds:  lipitor 10mg BID glipizide 2.5 mg daily ramipril 5mg daily hydralazine 50mg TID vitamin C cholecalciferol [id number] daily allopurinol    PHYSICAL EXAMINATION  T(C): , Max: 36.9 (-15 @ 20:00) HR: 60 (50 - 82) BP: 151/64 (105/51 - 151/64) RR: 32 (12 - 16) SpO2: 97% (93% - 100%)  NEUROLOGIC EXAMINATION:  Patient is awake, alert, fully oriented, R pupils 2mm equal and briskly reactive to light, EOMs intact, moves all 4s  GENERAL:  not intubated, not in cardiorespiratory distress  EENT: anicteric  CARDIOVASC:  (+) S1 S2, normal rate and regular rhythm  PULMONARY:  clear to auscultation bilaterally  ABDOMEN:  soft, nontender, with normoactive bowel sounds  EXTREMITIES:  no edema  SKIN:  no rash    LABS:  CAPILLARY BLOOD GLUCOSE 110 (16 Mar 2017 07:00) 158 (15 Mar 2017 12:00)                        10.1   7.2   )-----------( 121      ( 16 Mar 2017 04:14 )             31.2     140    |  106    |  22     ----------------------------<  125    4.3     |  28     |  1.05     Ca    8.8        16 Mar 2017 04:14  Phos  3.5       16 Mar 2017 04:14  Mg     2.3       16 Mar 2017 04:14    HbA1C 6.0    I & Os for current day (as of  @ 07:45)  IN: 3025 ml / OUT: 2735 ml / NET: 290 ml    Neuroimagin/10 Dense calcified atherosclerotic plaque R carotid bifurcation and distal common carotid artery, at least 84% stenosis of ICA  Other imaging:     MEDICATIONS: mod ISS asa 81mg daily lisinopril 20mg daily    IV FLUIDS: LR  DRIPS: cardene - off since last night  DIET:  Lines / Drains: IRAIDA Mccarty - removed this morning    CODE STATUS:  full code                       GOALS OF CARE:  aggressive                      DISPOSITION:  ICU

## 2017-03-16 NOTE — DISCHARGE NOTE ADULT - CARE PROVIDERS DIRECT ADDRESSES
,[email or web address]

## 2017-03-16 NOTE — CONSULT NOTE ADULT - SUBJECTIVE AND OBJECTIVE BOX
Patient is a 76y old  Male who presents with a chief complaint of R carotid stenosis (15 Mar 2017 11:10)        HPI:  76 yr old man with multiple medical problems, including DM, HTN, hyperlipidemia, and squamous cell carcinoma of head and face (currently in remission, S/P radical resection 2013 and XRT), who was recently found to have rapidly progressive R ICA stenosis on MRA (from 50% up to 85% on recent imaging). Pt is totally asymptomatic from a neurological standpoint, no recent or remote history of ischemic stroke. CTA shows a heavily calcified, short-segment atherosclerotic plaque at the origin of the R ICA. The patient is being admitted for elective R CEA. (15 Mar 2017 11:10)    s/p CEA- no new focal weakness post op      Allergies  azithromycin (Hives)  red dye (Hives)      Health Issues  CAROTID ATHEROSCLEROSIS  CAROTID ATHEROSCLEROSIS I  CAROTID ATHEROSCLEROSIS I65.29  No h/o HF  Handoff  History of skin cancer  Gout  HLD (hyperlipidemia)  History of radiation therapy  Raynauds phenomenon  Thrombocytopenia  Squamous cell carcinoma  IBS (irritable bowel syndrome)  Diabetes mellitus  BPH (benign prostatic hypertrophy)  Pneumonia  Hypertension  Stenosis of right carotid artery  Stenosis of right carotid artery  History of surgery  Status post lateral meniscus repair  Encounter for biopsy  Squamous cell carcinoma  H/O arthroscopy of knee  Hx of cholecystectomy        FAMILY HISTORY:      MEDICATIONS  (STANDING):  lactated ringers. 1000milliLiter(s) IV Continuous <Continuous>  insulin lispro (HumaLOG) corrective regimen sliding scale  SubCutaneous Before meals and at bedtime  dextrose 5%. 1000milliLiter(s) IV Continuous <Continuous>  dextrose 50% Injectable 12.5Gram(s) IV Push once  dextrose 50% Injectable 25Gram(s) IV Push once  dextrose 50% Injectable 25Gram(s) IV Push once  niCARdipine Infusion 5mG/Hr IV Continuous <Continuous>  aspirin enteric coated 81milliGRAM(s) Oral daily  lisinopril 20milliGRAM(s) Oral daily    MEDICATIONS  (PRN):  dextrose Gel 1Dose(s) Oral once PRN Blood Glucose LESS THAN 70 milliGRAM(s)/deciliter  glucagon  Injectable 1milliGRAM(s) IntraMuscular once PRN Glucose LESS THAN 70 milligrams/deciliter      PAST MEDICAL & SURGICAL HISTORY:  History of skin cancer  Gout  HLD (hyperlipidemia)  History of radiation therapy  Raynauds phenomenon  Thrombocytopenia  Squamous cell carcinoma  IBS (irritable bowel syndrome)  Diabetes mellitus: Type 2  BPH (benign prostatic hypertrophy)  Pneumonia: &gt; 5 years ago  Hypertension  History of surgery: skull excision and removal of left eye  Status post lateral meniscus repair  Encounter for biopsy: 12/2012 - left side of head  Squamous cell carcinoma: s/p multiple MOHs surgeries - head, face  last wide excision 1/13  H/O arthroscopy of knee: 12/2012 - left  Hx of cholecystectomy: 2006      Labs                          10.1   7.2   )-----------( 121      ( 16 Mar 2017 04:14 )             31.2     16 Mar 2017 04:14    140    |  106    |  22     ----------------------------<  125    4.3     |  28     |  1.05     Ca    8.8        16 Mar 2017 04:14  Phos  3.5       16 Mar 2017 04:14  Mg     2.3       16 Mar 2017 04:14        Radiology:    Physical Exam    MENTAL STATUS  -Level of Consciousness- awake    Orientation- person, place time  Language- aphasia/ dysarthria  Memory- recent and remote      Cranial Nerve 1- 12  Pupils-  reactive right  Eye movements- full on right  Facial - no asymmetry   Lower CN-nl    Gait and Station- n/a    MOTOR  Upper-nl  Lower-nl    Reflexes- decreased    Sensation- no sensory loss    Cerebellar- no tremors    vascular -     Assessment-s/p CEA- no new neuro deficit post op    Plan as per Dr Zamarripa

## 2017-03-16 NOTE — DISCHARGE NOTE ADULT - PLAN OF CARE
return to full function 1) May bathe.  Keep incision clean and dry.  White bandages (steri-strips) will fall off on their own (do not pull them off yourself)  2) Notify Dr. Zamarripa's office for pain uncontrolled with medication, or increasing redness/drainage near wound  3) Go to nearest emergency room if difficulty breathing/swallowing, new or worsening weakness of the arms/legs, fever>101  4) Call Dr. Zamarripa's office to schedule follow up appointment, [phone]

## 2017-03-16 NOTE — DISCHARGE NOTE ADULT - NS AS ACTIVITY OBS
Walking-Indoors allowed/No Heavy lifting/straining/Do not drive or operate machinery/Bathing allowed/Do not make important decisions/Stairs allowed

## 2017-03-16 NOTE — PHYSICAL THERAPY INITIAL EVALUATION ADULT - PERTINENT HX OF CURRENT PROBLEM, REHAB EVAL
Patient is a 76 year old male who was recently found to have rapidly progressive R ICA stenosis on MRA (from 50% up to 85% on recent imaging).

## 2017-03-20 DIAGNOSIS — M10.9 GOUT, UNSPECIFIED: ICD-10-CM

## 2017-03-20 DIAGNOSIS — I10 ESSENTIAL (PRIMARY) HYPERTENSION: ICD-10-CM

## 2017-03-20 DIAGNOSIS — Z92.3 PERSONAL HISTORY OF IRRADIATION: ICD-10-CM

## 2017-03-20 DIAGNOSIS — E11.9 TYPE 2 DIABETES MELLITUS WITHOUT COMPLICATIONS: ICD-10-CM

## 2017-03-20 DIAGNOSIS — I65.21 OCCLUSION AND STENOSIS OF RIGHT CAROTID ARTERY: ICD-10-CM

## 2017-03-20 DIAGNOSIS — Z85.828 PERSONAL HISTORY OF OTHER MALIGNANT NEOPLASM OF SKIN: ICD-10-CM

## 2017-03-20 DIAGNOSIS — I73.00 RAYNAUD'S SYNDROME WITHOUT GANGRENE: ICD-10-CM

## 2017-03-20 DIAGNOSIS — D69.6 THROMBOCYTOPENIA, UNSPECIFIED: ICD-10-CM

## 2017-03-21 ENCOUNTER — APPOINTMENT (OUTPATIENT)
Dept: NEUROSURGERY | Facility: CLINIC | Age: 77
End: 2017-03-21

## 2017-03-27 ENCOUNTER — APPOINTMENT (OUTPATIENT)
Dept: NEUROSURGERY | Facility: CLINIC | Age: 77
End: 2017-03-27

## 2017-03-27 VITALS — DIASTOLIC BLOOD PRESSURE: 84 MMHG | SYSTOLIC BLOOD PRESSURE: 145 MMHG

## 2017-03-27 VITALS — HEART RATE: 86 BPM

## 2017-03-27 DIAGNOSIS — Z09 ENCOUNTER FOR FOLLOW-UP EXAMINATION AFTER COMPLETED TREATMENT FOR CONDITIONS OTHER THAN MALIGNANT NEOPLASM: ICD-10-CM

## 2017-03-27 RX ORDER — OXYCODONE AND ACETAMINOPHEN 5; 325 MG/1; MG/1
5-325 TABLET ORAL
Qty: 30 | Refills: 0 | Status: ACTIVE | COMMUNITY
Start: 2017-03-16

## 2017-04-17 ENCOUNTER — APPOINTMENT (OUTPATIENT)
Dept: NEUROSURGERY | Facility: CLINIC | Age: 77
End: 2017-04-17

## 2017-04-17 VITALS — DIASTOLIC BLOOD PRESSURE: 67 MMHG | SYSTOLIC BLOOD PRESSURE: 159 MMHG

## 2017-04-17 VITALS
SYSTOLIC BLOOD PRESSURE: 163 MMHG | DIASTOLIC BLOOD PRESSURE: 73 MMHG | TEMPERATURE: 97.9 F | OXYGEN SATURATION: 99 % | HEART RATE: 62 BPM | BODY MASS INDEX: 25.39 KG/M2 | WEIGHT: 158 LBS | HEIGHT: 66 IN

## 2017-04-28 ENCOUNTER — RX RENEWAL (OUTPATIENT)
Age: 77
End: 2017-04-28

## 2017-05-12 ENCOUNTER — MEDICATION RENEWAL (OUTPATIENT)
Age: 77
End: 2017-05-12

## 2017-05-14 ENCOUNTER — EMERGENCY (EMERGENCY)
Facility: HOSPITAL | Age: 77
LOS: 1 days | Discharge: LEFT BEFORE DISCHARGE | End: 2017-05-14
Admitting: EMERGENCY MEDICINE

## 2017-05-14 VITALS
OXYGEN SATURATION: 96 % | SYSTOLIC BLOOD PRESSURE: 172 MMHG | DIASTOLIC BLOOD PRESSURE: 74 MMHG | WEIGHT: 158.07 LBS | TEMPERATURE: 98 F | HEART RATE: 109 BPM | RESPIRATION RATE: 12 BRPM

## 2017-05-14 DIAGNOSIS — K13.79 OTHER LESIONS OF ORAL MUCOSA: ICD-10-CM

## 2017-05-14 DIAGNOSIS — Z98.890 OTHER SPECIFIED POSTPROCEDURAL STATES: Chronic | ICD-10-CM

## 2017-05-14 NOTE — ED ADULT NURSE REASSESSMENT NOTE - NS ED NURSE REASSESS COMMENT FT1
patient wants to leave, states his wife is being transferred as a patient to [facility] and cannot stay here wants to go with her. Patient very well appearing, states bleeding is coming from his gums and will see his PMD and dentist tomorrow. Left without being seen by MD, states he feels fine and does not want to stay.

## 2017-05-14 NOTE — ED ADULT NURSE NOTE - EXPLANATION OF PATIENT'S REASON FOR LEAVING
patient's wife here as patient, being transferred to [facility] at this time and patient wants to go with her now

## 2017-05-19 ENCOUNTER — APPOINTMENT (OUTPATIENT)
Dept: INTERNAL MEDICINE | Facility: CLINIC | Age: 77
End: 2017-05-19

## 2017-05-19 VITALS — DIASTOLIC BLOOD PRESSURE: 70 MMHG | SYSTOLIC BLOOD PRESSURE: 160 MMHG

## 2017-05-19 VITALS
HEART RATE: 94 BPM | BODY MASS INDEX: 26.36 KG/M2 | TEMPERATURE: 98 F | WEIGHT: 164 LBS | DIASTOLIC BLOOD PRESSURE: 70 MMHG | SYSTOLIC BLOOD PRESSURE: 170 MMHG | OXYGEN SATURATION: 94 % | RESPIRATION RATE: 14 BRPM | HEIGHT: 66 IN

## 2017-05-19 DIAGNOSIS — F41.1 GENERALIZED ANXIETY DISORDER: ICD-10-CM

## 2017-05-19 DIAGNOSIS — F43.0 GENERALIZED ANXIETY DISORDER: ICD-10-CM

## 2017-05-19 RX ORDER — CHLORHEXIDINE GLUCONATE, 0.12% ORAL RINSE 1.2 MG/ML
0.12 SOLUTION DENTAL
Qty: 473 | Refills: 0 | Status: ACTIVE | COMMUNITY
Start: 2017-05-16

## 2017-05-22 ENCOUNTER — APPOINTMENT (OUTPATIENT)
Dept: INTERNAL MEDICINE | Facility: CLINIC | Age: 77
End: 2017-05-22

## 2017-06-06 ENCOUNTER — NON-APPOINTMENT (OUTPATIENT)
Age: 77
End: 2017-06-06

## 2017-06-06 ENCOUNTER — APPOINTMENT (OUTPATIENT)
Dept: INTERNAL MEDICINE | Facility: CLINIC | Age: 77
End: 2017-06-06

## 2017-06-06 VITALS — SYSTOLIC BLOOD PRESSURE: 160 MMHG | DIASTOLIC BLOOD PRESSURE: 80 MMHG | HEART RATE: 88 BPM

## 2017-06-06 VITALS
BODY MASS INDEX: 26.36 KG/M2 | HEIGHT: 66 IN | TEMPERATURE: 97.8 F | DIASTOLIC BLOOD PRESSURE: 70 MMHG | OXYGEN SATURATION: 96 % | WEIGHT: 164 LBS | SYSTOLIC BLOOD PRESSURE: 160 MMHG | HEART RATE: 104 BPM | RESPIRATION RATE: 14 BRPM

## 2017-06-20 ENCOUNTER — APPOINTMENT (OUTPATIENT)
Dept: INTERNAL MEDICINE | Facility: CLINIC | Age: 77
End: 2017-06-20

## 2017-06-20 VITALS
TEMPERATURE: 98.1 F | RESPIRATION RATE: 14 BRPM | HEIGHT: 66 IN | WEIGHT: 164 LBS | HEART RATE: 85 BPM | DIASTOLIC BLOOD PRESSURE: 60 MMHG | BODY MASS INDEX: 26.36 KG/M2 | OXYGEN SATURATION: 96 % | SYSTOLIC BLOOD PRESSURE: 150 MMHG

## 2017-07-03 ENCOUNTER — APPOINTMENT (OUTPATIENT)
Dept: INTERNAL MEDICINE | Facility: CLINIC | Age: 77
End: 2017-07-03

## 2017-07-03 VITALS
DIASTOLIC BLOOD PRESSURE: 62 MMHG | SYSTOLIC BLOOD PRESSURE: 120 MMHG | HEIGHT: 66 IN | OXYGEN SATURATION: 98 % | HEART RATE: 89 BPM | WEIGHT: 165 LBS | TEMPERATURE: 98 F | BODY MASS INDEX: 26.52 KG/M2 | RESPIRATION RATE: 14 BRPM

## 2017-07-03 VITALS — HEART RATE: 76 BPM | DIASTOLIC BLOOD PRESSURE: 76 MMHG | SYSTOLIC BLOOD PRESSURE: 144 MMHG

## 2017-07-03 RX ORDER — ALPRAZOLAM 0.25 MG/1
0.25 TABLET ORAL
Qty: 60 | Refills: 0 | Status: ACTIVE | COMMUNITY
Start: 2017-05-19 | End: 1900-01-01

## 2017-07-03 RX ORDER — HYDRALAZINE HYDROCHLORIDE 50 MG/1
50 TABLET ORAL
Qty: 270 | Refills: 0 | Status: DISCONTINUED | COMMUNITY
Start: 2017-05-12

## 2017-07-17 ENCOUNTER — APPOINTMENT (OUTPATIENT)
Dept: INTERNAL MEDICINE | Facility: CLINIC | Age: 77
End: 2017-07-17

## 2017-07-17 VITALS — DIASTOLIC BLOOD PRESSURE: 60 MMHG | SYSTOLIC BLOOD PRESSURE: 144 MMHG

## 2017-07-17 VITALS
OXYGEN SATURATION: 96 % | DIASTOLIC BLOOD PRESSURE: 50 MMHG | BODY MASS INDEX: 26.52 KG/M2 | RESPIRATION RATE: 14 BRPM | HEIGHT: 66 IN | SYSTOLIC BLOOD PRESSURE: 142 MMHG | HEART RATE: 92 BPM | TEMPERATURE: 98 F | WEIGHT: 165 LBS

## 2017-07-17 DIAGNOSIS — R09.82 POSTNASAL DRIP: ICD-10-CM

## 2017-07-17 RX ORDER — METOPROLOL SUCCINATE 25 MG/1
25 TABLET, EXTENDED RELEASE ORAL
Qty: 30 | Refills: 0 | Status: DISCONTINUED | COMMUNITY
Start: 2017-06-06

## 2017-07-17 RX ORDER — MONTELUKAST 10 MG/1
10 TABLET, FILM COATED ORAL
Qty: 30 | Refills: 1 | Status: ACTIVE | COMMUNITY
Start: 2017-07-17 | End: 1900-01-01

## 2017-07-31 ENCOUNTER — APPOINTMENT (OUTPATIENT)
Dept: INTERNAL MEDICINE | Facility: CLINIC | Age: 77
End: 2017-07-31
Payer: MEDICARE

## 2017-07-31 VITALS
HEART RATE: 78 BPM | SYSTOLIC BLOOD PRESSURE: 150 MMHG | WEIGHT: 162 LBS | TEMPERATURE: 98 F | RESPIRATION RATE: 14 BRPM | BODY MASS INDEX: 26.03 KG/M2 | DIASTOLIC BLOOD PRESSURE: 50 MMHG | HEIGHT: 66 IN | OXYGEN SATURATION: 98 %

## 2017-07-31 VITALS — DIASTOLIC BLOOD PRESSURE: 60 MMHG | SYSTOLIC BLOOD PRESSURE: 150 MMHG

## 2017-07-31 PROCEDURE — 36415 COLL VENOUS BLD VENIPUNCTURE: CPT

## 2017-07-31 PROCEDURE — 99214 OFFICE O/P EST MOD 30 MIN: CPT | Mod: 25

## 2017-07-31 RX ORDER — ASPIRIN ENTERIC COATED TABLETS 81 MG 81 MG/1
81 TABLET, DELAYED RELEASE ORAL
Qty: 30 | Refills: 0 | Status: DISCONTINUED | COMMUNITY
Start: 2017-04-13 | End: 2017-07-31

## 2017-07-31 RX ORDER — HYDROCHLOROTHIAZIDE 25 MG/1
25 TABLET ORAL DAILY
Qty: 90 | Refills: 1 | Status: ACTIVE | COMMUNITY
Start: 2017-07-31

## 2017-08-01 ENCOUNTER — MEDICATION RENEWAL (OUTPATIENT)
Age: 77
End: 2017-08-01

## 2017-08-01 LAB
ANION GAP SERPL CALC-SCNC: 13 MMOL/L
BUN SERPL-MCNC: 42 MG/DL
CALCIUM SERPL-MCNC: 9.8 MG/DL
CHLORIDE SERPL-SCNC: 106 MMOL/L
CO2 SERPL-SCNC: 21 MMOL/L
CREAT SERPL-MCNC: 1.41 MG/DL
GLUCOSE SERPL-MCNC: 90 MG/DL
POTASSIUM SERPL-SCNC: 5.3 MMOL/L
SODIUM SERPL-SCNC: 140 MMOL/L

## 2017-08-03 ENCOUNTER — RX RENEWAL (OUTPATIENT)
Age: 77
End: 2017-08-03

## 2017-08-13 ENCOUNTER — TRANSCRIPTION ENCOUNTER (OUTPATIENT)
Age: 77
End: 2017-08-13

## 2017-08-14 ENCOUNTER — APPOINTMENT (OUTPATIENT)
Dept: INTERNAL MEDICINE | Facility: CLINIC | Age: 77
End: 2017-08-14
Payer: MEDICARE

## 2017-08-14 VITALS
TEMPERATURE: 97.9 F | RESPIRATION RATE: 14 BRPM | OXYGEN SATURATION: 97 % | HEIGHT: 69 IN | SYSTOLIC BLOOD PRESSURE: 138 MMHG | WEIGHT: 162 LBS | BODY MASS INDEX: 23.99 KG/M2 | DIASTOLIC BLOOD PRESSURE: 62 MMHG | HEART RATE: 72 BPM

## 2017-08-14 VITALS — SYSTOLIC BLOOD PRESSURE: 142 MMHG | DIASTOLIC BLOOD PRESSURE: 60 MMHG

## 2017-08-14 DIAGNOSIS — Z87.09 PERSONAL HISTORY OF OTHER DISEASES OF THE RESPIRATORY SYSTEM: ICD-10-CM

## 2017-08-14 LAB
ANION GAP SERPL CALC-SCNC: 15 MMOL/L
BUN SERPL-MCNC: 35 MG/DL
CALCIUM SERPL-MCNC: 9.6 MG/DL
CHLORIDE SERPL-SCNC: 103 MMOL/L
CO2 SERPL-SCNC: 24 MMOL/L
CREAT SERPL-MCNC: 1.41 MG/DL
GLUCOSE SERPL-MCNC: 214 MG/DL
POTASSIUM SERPL-SCNC: 5.3 MMOL/L
SODIUM SERPL-SCNC: 142 MMOL/L

## 2017-08-14 PROCEDURE — 99214 OFFICE O/P EST MOD 30 MIN: CPT | Mod: 25

## 2017-08-14 PROCEDURE — 36415 COLL VENOUS BLD VENIPUNCTURE: CPT

## 2017-08-16 ENCOUNTER — APPOINTMENT (OUTPATIENT)
Dept: CARDIOLOGY | Facility: CLINIC | Age: 77
End: 2017-08-16
Payer: MEDICARE

## 2017-08-16 PROCEDURE — 93268 ECG RECORD/REVIEW: CPT

## 2017-08-18 ENCOUNTER — APPOINTMENT (OUTPATIENT)
Dept: CARDIOLOGY | Facility: CLINIC | Age: 77
End: 2017-08-18
Payer: MEDICARE

## 2017-08-18 ENCOUNTER — NON-APPOINTMENT (OUTPATIENT)
Age: 77
End: 2017-08-18

## 2017-08-18 VITALS
HEART RATE: 83 BPM | HEIGHT: 69 IN | DIASTOLIC BLOOD PRESSURE: 59 MMHG | SYSTOLIC BLOOD PRESSURE: 140 MMHG | BODY MASS INDEX: 23.99 KG/M2 | WEIGHT: 162 LBS

## 2017-08-18 DIAGNOSIS — E78.5 HYPERLIPIDEMIA, UNSPECIFIED: ICD-10-CM

## 2017-08-18 DIAGNOSIS — I65.29 OCCLUSION AND STENOSIS OF UNSPECIFIED CAROTID ARTERY: ICD-10-CM

## 2017-08-18 PROCEDURE — 93000 ELECTROCARDIOGRAM COMPLETE: CPT

## 2017-08-18 PROCEDURE — 99214 OFFICE O/P EST MOD 30 MIN: CPT

## 2017-08-21 ENCOUNTER — APPOINTMENT (OUTPATIENT)
Dept: INTERNAL MEDICINE | Facility: CLINIC | Age: 77
End: 2017-08-21
Payer: MEDICARE

## 2017-08-21 VITALS
RESPIRATION RATE: 14 BRPM | HEIGHT: 69 IN | DIASTOLIC BLOOD PRESSURE: 54 MMHG | TEMPERATURE: 97.9 F | BODY MASS INDEX: 23.7 KG/M2 | HEART RATE: 71 BPM | WEIGHT: 160 LBS | SYSTOLIC BLOOD PRESSURE: 150 MMHG | OXYGEN SATURATION: 96 %

## 2017-08-21 VITALS — DIASTOLIC BLOOD PRESSURE: 60 MMHG | SYSTOLIC BLOOD PRESSURE: 136 MMHG

## 2017-08-21 DIAGNOSIS — J18.9 PNEUMONIA, UNSPECIFIED ORGANISM: ICD-10-CM

## 2017-08-21 PROCEDURE — 99214 OFFICE O/P EST MOD 30 MIN: CPT

## 2017-08-21 RX ORDER — BENZONATATE 100 MG/1
100 CAPSULE ORAL
Qty: 30 | Refills: 0 | Status: ACTIVE | COMMUNITY
Start: 2017-08-21 | End: 1900-01-01

## 2017-08-21 RX ORDER — DOXYCYCLINE HYCLATE 100 MG/1
100 CAPSULE ORAL
Qty: 14 | Refills: 0 | Status: ACTIVE | COMMUNITY
Start: 2017-08-13

## 2017-08-23 ENCOUNTER — RX RENEWAL (OUTPATIENT)
Age: 77
End: 2017-08-23

## 2017-09-18 ENCOUNTER — APPOINTMENT (OUTPATIENT)
Dept: INTERNAL MEDICINE | Facility: CLINIC | Age: 77
End: 2017-09-18
Payer: MEDICARE

## 2017-09-18 VITALS
HEIGHT: 69 IN | BODY MASS INDEX: 24.14 KG/M2 | OXYGEN SATURATION: 98 % | HEART RATE: 81 BPM | RESPIRATION RATE: 14 BRPM | TEMPERATURE: 98 F | DIASTOLIC BLOOD PRESSURE: 60 MMHG | WEIGHT: 163 LBS | SYSTOLIC BLOOD PRESSURE: 148 MMHG

## 2017-09-18 VITALS — SYSTOLIC BLOOD PRESSURE: 138 MMHG | DIASTOLIC BLOOD PRESSURE: 64 MMHG

## 2017-09-18 DIAGNOSIS — R35.0 FREQUENCY OF MICTURITION: ICD-10-CM

## 2017-09-18 PROCEDURE — 99214 OFFICE O/P EST MOD 30 MIN: CPT

## 2017-09-26 ENCOUNTER — APPOINTMENT (OUTPATIENT)
Dept: CARDIOLOGY | Facility: CLINIC | Age: 77
End: 2017-09-26
Payer: MEDICARE

## 2017-09-26 PROCEDURE — 93880 EXTRACRANIAL BILAT STUDY: CPT

## 2017-10-20 ENCOUNTER — RX RENEWAL (OUTPATIENT)
Age: 77
End: 2017-10-20

## 2017-11-14 ENCOUNTER — EMERGENCY (EMERGENCY)
Facility: HOSPITAL | Age: 77
LOS: 1 days | Discharge: HOME | End: 2017-11-14
Attending: EMERGENCY MEDICINE | Admitting: EMERGENCY MEDICINE
Payer: MEDICARE

## 2017-11-14 ENCOUNTER — RX RENEWAL (OUTPATIENT)
Age: 77
End: 2017-11-14

## 2017-11-14 VITALS
RESPIRATION RATE: 16 BRPM | DIASTOLIC BLOOD PRESSURE: 77 MMHG | OXYGEN SATURATION: 99 % | SYSTOLIC BLOOD PRESSURE: 162 MMHG | HEART RATE: 89 BPM

## 2017-11-14 VITALS
TEMPERATURE: 98 F | WEIGHT: 162.04 LBS | DIASTOLIC BLOOD PRESSURE: 82 MMHG | HEIGHT: 69 IN | OXYGEN SATURATION: 98 % | SYSTOLIC BLOOD PRESSURE: 177 MMHG | HEART RATE: 98 BPM | RESPIRATION RATE: 14 BRPM

## 2017-11-14 DIAGNOSIS — Z98.890 OTHER SPECIFIED POSTPROCEDURAL STATES: Chronic | ICD-10-CM

## 2017-11-14 LAB
ALBUMIN SERPL ELPH-MCNC: 3.5 G/DL — SIGNIFICANT CHANGE UP (ref 3.3–5)
ALP SERPL-CCNC: 73 U/L — SIGNIFICANT CHANGE UP (ref 40–120)
ALT FLD-CCNC: 26 U/L — SIGNIFICANT CHANGE UP (ref 12–78)
ANION GAP SERPL CALC-SCNC: 6 MMOL/L — SIGNIFICANT CHANGE UP (ref 5–17)
APTT BLD: 34.7 SEC — SIGNIFICANT CHANGE UP (ref 27.5–37.4)
AST SERPL-CCNC: 21 U/L — SIGNIFICANT CHANGE UP (ref 15–37)
BASOPHILS # BLD AUTO: 0 K/UL — SIGNIFICANT CHANGE UP (ref 0–0.2)
BASOPHILS NFR BLD AUTO: 0.7 % — SIGNIFICANT CHANGE UP (ref 0–2)
BILIRUB SERPL-MCNC: 0.5 MG/DL — SIGNIFICANT CHANGE UP (ref 0.2–1.2)
BUN SERPL-MCNC: 33 MG/DL — HIGH (ref 7–23)
CALCIUM SERPL-MCNC: 8.7 MG/DL — SIGNIFICANT CHANGE UP (ref 8.5–10.1)
CHLORIDE SERPL-SCNC: 111 MMOL/L — HIGH (ref 96–108)
CK SERPL-CCNC: 104 U/L — SIGNIFICANT CHANGE UP (ref 26–308)
CO2 SERPL-SCNC: 25 MMOL/L — SIGNIFICANT CHANGE UP (ref 22–31)
CREAT SERPL-MCNC: 1.2 MG/DL — SIGNIFICANT CHANGE UP (ref 0.5–1.3)
EOSINOPHIL # BLD AUTO: 0.1 K/UL — SIGNIFICANT CHANGE UP (ref 0–0.5)
EOSINOPHIL NFR BLD AUTO: 2.3 % — SIGNIFICANT CHANGE UP (ref 0–6)
GLUCOSE SERPL-MCNC: 135 MG/DL — HIGH (ref 70–99)
HCT VFR BLD CALC: 33.1 % — LOW (ref 39–50)
HGB BLD-MCNC: 10.3 G/DL — LOW (ref 13–17)
INR BLD: 1.15 RATIO — SIGNIFICANT CHANGE UP (ref 0.88–1.16)
LYMPHOCYTES # BLD AUTO: 1.9 K/UL — SIGNIFICANT CHANGE UP (ref 1–3.3)
LYMPHOCYTES # BLD AUTO: 39.5 % — SIGNIFICANT CHANGE UP (ref 13–44)
MCHC RBC-ENTMCNC: 31.2 GM/DL — LOW (ref 32–36)
MCHC RBC-ENTMCNC: 32.5 PG — SIGNIFICANT CHANGE UP (ref 27–34)
MCV RBC AUTO: 104.1 FL — HIGH (ref 80–100)
MONOCYTES # BLD AUTO: 0.4 K/UL — SIGNIFICANT CHANGE UP (ref 0–0.9)
MONOCYTES NFR BLD AUTO: 7.4 % — SIGNIFICANT CHANGE UP (ref 1–9)
NEUTROPHILS # BLD AUTO: 2.4 K/UL — SIGNIFICANT CHANGE UP (ref 1.8–7.4)
NEUTROPHILS NFR BLD AUTO: 50.1 % — SIGNIFICANT CHANGE UP (ref 43–77)
PLATELET # BLD AUTO: 164 K/UL — SIGNIFICANT CHANGE UP (ref 150–400)
POTASSIUM SERPL-MCNC: 5 MMOL/L — SIGNIFICANT CHANGE UP (ref 3.5–5.3)
POTASSIUM SERPL-SCNC: 5 MMOL/L — SIGNIFICANT CHANGE UP (ref 3.5–5.3)
PROT SERPL-MCNC: 7.9 G/DL — SIGNIFICANT CHANGE UP (ref 6–8.3)
PROTHROM AB SERPL-ACNC: 12.6 SEC — SIGNIFICANT CHANGE UP (ref 9.8–12.7)
RBC # BLD: 3.18 M/UL — LOW (ref 4.2–5.8)
RBC # FLD: 13.9 % — SIGNIFICANT CHANGE UP (ref 10.3–14.5)
SODIUM SERPL-SCNC: 142 MMOL/L — SIGNIFICANT CHANGE UP (ref 135–145)
WBC # BLD: 4.9 K/UL — SIGNIFICANT CHANGE UP (ref 3.8–10.5)
WBC # FLD AUTO: 4.9 K/UL — SIGNIFICANT CHANGE UP (ref 3.8–10.5)

## 2017-11-14 PROCEDURE — 93926 LOWER EXTREMITY STUDY: CPT

## 2017-11-14 PROCEDURE — 99284 EMERGENCY DEPT VISIT MOD MDM: CPT

## 2017-11-14 PROCEDURE — 93971 EXTREMITY STUDY: CPT | Mod: 26,RT

## 2017-11-14 PROCEDURE — 93971 EXTREMITY STUDY: CPT

## 2017-11-14 PROCEDURE — 82550 ASSAY OF CK (CPK): CPT

## 2017-11-14 PROCEDURE — 80053 COMPREHEN METABOLIC PANEL: CPT

## 2017-11-14 PROCEDURE — 85027 COMPLETE CBC AUTOMATED: CPT

## 2017-11-14 PROCEDURE — 93926 LOWER EXTREMITY STUDY: CPT | Mod: 26,RT

## 2017-11-14 PROCEDURE — 85730 THROMBOPLASTIN TIME PARTIAL: CPT

## 2017-11-14 PROCEDURE — 85610 PROTHROMBIN TIME: CPT

## 2017-11-14 PROCEDURE — 36415 COLL VENOUS BLD VENIPUNCTURE: CPT

## 2017-11-14 PROCEDURE — 99284 EMERGENCY DEPT VISIT MOD MDM: CPT | Mod: 25

## 2017-11-14 NOTE — ED PROVIDER NOTE - PHYSICAL EXAMINATION
RLE: swelling to right lower leg, +calf tenderness; +wound to anterior aspect with sutures in place, and surrounding ecchymoses; no erythema no warmth  1+ dp pulses b/l feet  sensation intact to right lower leg/foot

## 2017-11-14 NOTE — ED PROVIDER NOTE - OBJECTIVE STATEMENT
78 yo male with hx htn, dm, hld c/o pain/swelling to RLE. Patient with hx excision of lesion to anterior aspect RLE x 10 days ago. Patient had rebleeding and 5 days ago sutures were removed, and resutured. Patient had compression dressing, which has been removed. Past few days, patient with increased pain and swelling to leg. Last night, patient noticed purple color to toes. denies any recent trauma    pmd Tyrone Steele

## 2017-11-14 NOTE — ED ADULT NURSE NOTE - OBJECTIVE STATEMENT
pt to ed c/o right leg swelling and discoloration and cool to touch distal toes of right foot. Positive pedal pulse, afebrile, will continue to monitor

## 2017-11-14 NOTE — ED PROVIDER NOTE - PROGRESS NOTE DETAILS
arterial doppler note - with occlusion anterior tibial artery, with good flow posterior tibial and peroneal; +palpable DP pulses and foot is warm; patient seen at bedside by Dr. Alvares and case d/w vascular Dr. Palacios who recommends discharge and follow up as outpatient

## 2017-11-30 ENCOUNTER — APPOINTMENT (OUTPATIENT)
Dept: NEUROLOGY | Facility: CLINIC | Age: 77
End: 2017-11-30
Payer: MEDICARE

## 2017-11-30 VITALS — OXYGEN SATURATION: 97 % | DIASTOLIC BLOOD PRESSURE: 68 MMHG | HEART RATE: 77 BPM | SYSTOLIC BLOOD PRESSURE: 142 MMHG

## 2017-11-30 DIAGNOSIS — I65.21 OCCLUSION AND STENOSIS OF RIGHT CAROTID ARTERY: ICD-10-CM

## 2017-11-30 PROCEDURE — 99214 OFFICE O/P EST MOD 30 MIN: CPT

## 2017-11-30 RX ORDER — CEPHALEXIN 500 MG/1
500 CAPSULE ORAL
Qty: 42 | Refills: 0 | Status: DISCONTINUED | COMMUNITY
Start: 2017-11-08

## 2017-12-01 ENCOUNTER — OTHER (OUTPATIENT)
Age: 77
End: 2017-12-01

## 2017-12-12 ENCOUNTER — RX RENEWAL (OUTPATIENT)
Age: 77
End: 2017-12-12

## 2017-12-12 RX ORDER — METOPROLOL SUCCINATE 50 MG/1
50 TABLET, EXTENDED RELEASE ORAL DAILY
Qty: 90 | Refills: 1 | Status: ACTIVE | COMMUNITY
Start: 2017-06-06 | End: 1900-01-01

## 2017-12-20 ENCOUNTER — APPOINTMENT (OUTPATIENT)
Dept: MRI IMAGING | Facility: CLINIC | Age: 77
End: 2017-12-20
Payer: MEDICARE

## 2017-12-20 ENCOUNTER — OUTPATIENT (OUTPATIENT)
Dept: OUTPATIENT SERVICES | Facility: HOSPITAL | Age: 77
LOS: 1 days | End: 2017-12-20
Payer: MEDICARE

## 2017-12-20 DIAGNOSIS — Z98.890 OTHER SPECIFIED POSTPROCEDURAL STATES: Chronic | ICD-10-CM

## 2017-12-20 DIAGNOSIS — Z00.8 ENCOUNTER FOR OTHER GENERAL EXAMINATION: ICD-10-CM

## 2017-12-20 PROCEDURE — A9585: CPT

## 2017-12-20 PROCEDURE — 70543 MRI ORBT/FAC/NCK W/O &W/DYE: CPT

## 2017-12-20 PROCEDURE — 70553 MRI BRAIN STEM W/O & W/DYE: CPT

## 2017-12-20 PROCEDURE — 70543 MRI ORBT/FAC/NCK W/O &W/DYE: CPT | Mod: 26

## 2017-12-20 PROCEDURE — 70553 MRI BRAIN STEM W/O & W/DYE: CPT | Mod: 26

## 2017-12-27 DIAGNOSIS — C44.42 SQUAMOUS CELL CARCINOMA OF SKIN OF SCALP AND NECK: ICD-10-CM

## 2017-12-27 DIAGNOSIS — Z98.890 OTHER SPECIFIED POSTPROCEDURAL STATES: ICD-10-CM

## 2017-12-27 DIAGNOSIS — R04.0 EPISTAXIS: ICD-10-CM

## 2017-12-27 DIAGNOSIS — C69.62 MALIGNANT NEOPLASM OF LEFT ORBIT: ICD-10-CM

## 2018-01-12 ENCOUNTER — APPOINTMENT (OUTPATIENT)
Dept: INTERNAL MEDICINE | Facility: CLINIC | Age: 78
End: 2018-01-12
Payer: MEDICARE

## 2018-01-12 VITALS — SYSTOLIC BLOOD PRESSURE: 140 MMHG | DIASTOLIC BLOOD PRESSURE: 70 MMHG

## 2018-01-12 VITALS
RESPIRATION RATE: 14 BRPM | SYSTOLIC BLOOD PRESSURE: 130 MMHG | OXYGEN SATURATION: 98 % | HEART RATE: 90 BPM | BODY MASS INDEX: 24.44 KG/M2 | HEIGHT: 69 IN | DIASTOLIC BLOOD PRESSURE: 70 MMHG | TEMPERATURE: 97.5 F | WEIGHT: 165 LBS

## 2018-01-12 DIAGNOSIS — D64.9 ANEMIA, UNSPECIFIED: ICD-10-CM

## 2018-01-12 DIAGNOSIS — E11.9 TYPE 2 DIABETES MELLITUS W/OUT COMPLICATIONS: ICD-10-CM

## 2018-01-12 PROCEDURE — 36415 COLL VENOUS BLD VENIPUNCTURE: CPT

## 2018-01-12 PROCEDURE — 99214 OFFICE O/P EST MOD 30 MIN: CPT | Mod: 25

## 2018-01-12 PROCEDURE — G0009: CPT

## 2018-01-12 PROCEDURE — 90732 PPSV23 VACC 2 YRS+ SUBQ/IM: CPT

## 2018-01-13 ENCOUNTER — MEDICATION RENEWAL (OUTPATIENT)
Age: 78
End: 2018-01-13

## 2018-01-13 LAB
ANION GAP SERPL CALC-SCNC: 14 MMOL/L
BUN SERPL-MCNC: 41 MG/DL
CALCIUM SERPL-MCNC: 9.5 MG/DL
CHLORIDE SERPL-SCNC: 103 MMOL/L
CO2 SERPL-SCNC: 25 MMOL/L
CREAT SERPL-MCNC: 1.37 MG/DL
GLUCOSE SERPL-MCNC: 133 MG/DL
POTASSIUM SERPL-SCNC: 5.6 MMOL/L
SODIUM SERPL-SCNC: 142 MMOL/L

## 2018-01-14 LAB
BASOPHILS # BLD AUTO: 0.01 K/UL
BASOPHILS NFR BLD AUTO: 0.2 %
EOSINOPHIL # BLD AUTO: 0.14 K/UL
EOSINOPHIL NFR BLD AUTO: 2.8 %
FERRITIN SERPL-MCNC: 105 NG/ML
FOLATE SERPL-MCNC: >20 NG/ML
HCT VFR BLD CALC: 33.1 %
HGB BLD-MCNC: 10.2 G/DL
IMM GRANULOCYTES NFR BLD AUTO: 0.2 %
IRON SATN MFR SERPL: 27 %
IRON SERPL-MCNC: 80 UG/DL
LYMPHOCYTES # BLD AUTO: 2.05 K/UL
LYMPHOCYTES NFR BLD AUTO: 41.3 %
MAN DIFF?: NORMAL
MCHC RBC-ENTMCNC: 30.8 GM/DL
MCHC RBC-ENTMCNC: 32 PG
MCV RBC AUTO: 103.8 FL
MONOCYTES # BLD AUTO: 0.42 K/UL
MONOCYTES NFR BLD AUTO: 8.5 %
NEUTROPHILS # BLD AUTO: 2.33 K/UL
NEUTROPHILS NFR BLD AUTO: 47 %
PLATELET # BLD AUTO: 150 K/UL
RBC # BLD: 3.19 M/UL
RBC # FLD: 14.4 %
TIBC SERPL-MCNC: 291 UG/DL
UIBC SERPL-MCNC: 211 UG/DL
VIT B12 SERPL-MCNC: 445 PG/ML
WBC # FLD AUTO: 4.96 K/UL

## 2018-01-15 LAB
ANION GAP SERPL CALC-SCNC: 14 MMOL/L
BUN SERPL-MCNC: 37 MG/DL
CALCIUM SERPL-MCNC: 9.6 MG/DL
CHLORIDE SERPL-SCNC: 103 MMOL/L
CO2 SERPL-SCNC: 25 MMOL/L
CREAT SERPL-MCNC: 1.35 MG/DL
GLUCOSE SERPL-MCNC: 153 MG/DL
HEMOCCULT STL QL IA: NEGATIVE
POTASSIUM SERPL-SCNC: 4.8 MMOL/L
SODIUM SERPL-SCNC: 142 MMOL/L

## 2018-01-17 ENCOUNTER — APPOINTMENT (OUTPATIENT)
Dept: INTERNAL MEDICINE | Facility: CLINIC | Age: 78
End: 2018-01-17
Payer: MEDICARE

## 2018-01-17 VITALS — DIASTOLIC BLOOD PRESSURE: 70 MMHG | SYSTOLIC BLOOD PRESSURE: 140 MMHG

## 2018-01-17 VITALS
OXYGEN SATURATION: 99 % | WEIGHT: 164 LBS | SYSTOLIC BLOOD PRESSURE: 146 MMHG | HEIGHT: 69 IN | DIASTOLIC BLOOD PRESSURE: 62 MMHG | HEART RATE: 78 BPM | BODY MASS INDEX: 24.29 KG/M2 | RESPIRATION RATE: 14 BRPM | TEMPERATURE: 97.9 F

## 2018-01-17 DIAGNOSIS — Z60.2 PROBLEMS RELATED TO LIVING ALONE: ICD-10-CM

## 2018-01-17 DIAGNOSIS — Z63.4 DISAPPEARANCE AND DEATH OF FAMILY MEMBER: ICD-10-CM

## 2018-01-17 PROCEDURE — 36415 COLL VENOUS BLD VENIPUNCTURE: CPT

## 2018-01-17 PROCEDURE — 99214 OFFICE O/P EST MOD 30 MIN: CPT | Mod: 25

## 2018-01-17 SDOH — SOCIAL STABILITY - SOCIAL INSECURITY: PROBLEMS RELATED TO LIVING ALONE: Z60.2

## 2018-01-17 SDOH — SOCIAL STABILITY - SOCIAL INSECURITY: DISSAPEARANCE AND DEATH OF FAMILY MEMBER: Z63.4

## 2018-01-18 LAB
ANION GAP SERPL CALC-SCNC: 13 MMOL/L
BUN SERPL-MCNC: 37 MG/DL
CALCIUM SERPL-MCNC: 9.6 MG/DL
CHLORIDE SERPL-SCNC: 102 MMOL/L
CO2 SERPL-SCNC: 24 MMOL/L
CREAT SERPL-MCNC: 1.31 MG/DL
GLUCOSE SERPL-MCNC: 114 MG/DL
POTASSIUM SERPL-SCNC: 5.1 MMOL/L
SODIUM SERPL-SCNC: 139 MMOL/L

## 2018-01-22 ENCOUNTER — APPOINTMENT (OUTPATIENT)
Dept: INTERNAL MEDICINE | Facility: CLINIC | Age: 78
End: 2018-01-22
Payer: MEDICARE

## 2018-01-22 VITALS — SYSTOLIC BLOOD PRESSURE: 160 MMHG | DIASTOLIC BLOOD PRESSURE: 70 MMHG

## 2018-01-22 VITALS
HEIGHT: 69 IN | OXYGEN SATURATION: 98 % | TEMPERATURE: 97.8 F | BODY MASS INDEX: 24.29 KG/M2 | SYSTOLIC BLOOD PRESSURE: 162 MMHG | RESPIRATION RATE: 14 BRPM | DIASTOLIC BLOOD PRESSURE: 64 MMHG | WEIGHT: 164 LBS | HEART RATE: 78 BPM

## 2018-01-22 PROCEDURE — 99214 OFFICE O/P EST MOD 30 MIN: CPT

## 2018-01-23 ENCOUNTER — OTHER (OUTPATIENT)
Age: 78
End: 2018-01-23

## 2018-01-23 LAB
ANION GAP SERPL CALC-SCNC: 15 MMOL/L
BUN SERPL-MCNC: 28 MG/DL
CALCIUM SERPL-MCNC: 9.6 MG/DL
CHLORIDE SERPL-SCNC: 102 MMOL/L
CO2 SERPL-SCNC: 25 MMOL/L
CREAT SERPL-MCNC: 1.22 MG/DL
GLUCOSE SERPL-MCNC: 94 MG/DL
POTASSIUM SERPL-SCNC: 5.6 MMOL/L
SODIUM SERPL-SCNC: 142 MMOL/L

## 2018-01-24 ENCOUNTER — OUTPATIENT (OUTPATIENT)
Dept: OUTPATIENT SERVICES | Facility: HOSPITAL | Age: 78
LOS: 1 days | End: 2018-01-24
Payer: MEDICARE

## 2018-01-24 ENCOUNTER — APPOINTMENT (OUTPATIENT)
Dept: NUCLEAR MEDICINE | Facility: CLINIC | Age: 78
End: 2018-01-24

## 2018-01-24 ENCOUNTER — RX RENEWAL (OUTPATIENT)
Age: 78
End: 2018-01-24

## 2018-01-24 DIAGNOSIS — Z98.890 OTHER SPECIFIED POSTPROCEDURAL STATES: Chronic | ICD-10-CM

## 2018-01-24 DIAGNOSIS — Z00.8 ENCOUNTER FOR OTHER GENERAL EXAMINATION: ICD-10-CM

## 2018-01-24 PROCEDURE — 78815 PET IMAGE W/CT SKULL-THIGH: CPT

## 2018-01-24 PROCEDURE — 78815 PET IMAGE W/CT SKULL-THIGH: CPT | Mod: 26,PS

## 2018-01-24 PROCEDURE — A9552: CPT

## 2018-01-24 RX ORDER — HYDROCHLOROTHIAZIDE 25 MG/1
25 TABLET ORAL DAILY
Qty: 90 | Refills: 1 | Status: ACTIVE | COMMUNITY
Start: 2017-08-01 | End: 1900-01-01

## 2018-01-24 RX ORDER — HYDROCHLOROTHIAZIDE 12.5 MG/1
12.5 CAPSULE ORAL
Qty: 30 | Refills: 2 | Status: DISCONTINUED | COMMUNITY
Start: 2017-07-17 | End: 2018-01-24

## 2018-01-25 ENCOUNTER — RX RENEWAL (OUTPATIENT)
Age: 78
End: 2018-01-25

## 2018-01-25 ENCOUNTER — MEDICATION RENEWAL (OUTPATIENT)
Age: 78
End: 2018-01-25

## 2018-01-29 ENCOUNTER — APPOINTMENT (OUTPATIENT)
Dept: INTERNAL MEDICINE | Facility: CLINIC | Age: 78
End: 2018-01-29
Payer: MEDICARE

## 2018-01-29 VITALS
WEIGHT: 164 LBS | DIASTOLIC BLOOD PRESSURE: 64 MMHG | HEART RATE: 74 BPM | BODY MASS INDEX: 24.29 KG/M2 | RESPIRATION RATE: 14 BRPM | OXYGEN SATURATION: 97 % | HEIGHT: 69 IN | TEMPERATURE: 97.4 F | SYSTOLIC BLOOD PRESSURE: 126 MMHG

## 2018-01-29 VITALS — DIASTOLIC BLOOD PRESSURE: 70 MMHG | SYSTOLIC BLOOD PRESSURE: 136 MMHG

## 2018-01-29 DIAGNOSIS — I10 ESSENTIAL (PRIMARY) HYPERTENSION: ICD-10-CM

## 2018-01-29 DIAGNOSIS — E87.5 HYPERKALEMIA: ICD-10-CM

## 2018-01-29 PROCEDURE — 99214 OFFICE O/P EST MOD 30 MIN: CPT

## 2018-02-02 ENCOUNTER — MEDICATION RENEWAL (OUTPATIENT)
Age: 78
End: 2018-02-02

## 2018-02-02 RX ORDER — AMLODIPINE BESYLATE 2.5 MG/1
2.5 TABLET ORAL DAILY
Qty: 90 | Refills: 1 | Status: ACTIVE | COMMUNITY
Start: 2018-01-22

## 2018-02-14 ENCOUNTER — APPOINTMENT (OUTPATIENT)
Dept: INTERNAL MEDICINE | Facility: CLINIC | Age: 78
End: 2018-02-14

## 2018-02-17 ENCOUNTER — RX RENEWAL (OUTPATIENT)
Age: 78
End: 2018-02-17
